# Patient Record
Sex: FEMALE | Race: ASIAN | NOT HISPANIC OR LATINO | Employment: FULL TIME | URBAN - METROPOLITAN AREA
[De-identification: names, ages, dates, MRNs, and addresses within clinical notes are randomized per-mention and may not be internally consistent; named-entity substitution may affect disease eponyms.]

---

## 2017-07-14 ENCOUNTER — NON-PROVIDER VISIT (OUTPATIENT)
Dept: OCCUPATIONAL MEDICINE | Facility: CLINIC | Age: 47
End: 2017-07-14

## 2017-07-14 DIAGNOSIS — Z02.1 PRE-EMPLOYMENT HEALTH SCREENING EXAMINATION: ICD-10-CM

## 2017-07-14 PROCEDURE — 8898 PR URINE 11 PANEL - SEND TO LAB: Performed by: PREVENTIVE MEDICINE

## 2017-07-14 NOTE — MR AVS SNAPSHOT
Patti Cooley   2017 8:30 AM   Appointment   MRN: [id number]    Department:  [facility]   Dept Phone:  [phone]    Description:  Female : 1970   Provider:  [place] NON RENOWN MA           Allergies as of 2017     Not on File      Basic Information     Date Of Birth Sex Race Ethnicity Preferred Language    1970 Female Unknown Unknown English      Health Maintenance     Patient has no pending health maintenance at this time      Current Immunizations     No immunizations on file.      Below and/or attached are the medications your provider expects you to take. Review all of your home medications and newly ordered medications with your provider and/or pharmacist. Follow medication instructions as directed by your provider and/or pharmacist. Please keep your medication list with you and share with your provider. Update the information when medications are discontinued, doses are changed, or new medications (including over-the-counter products) are added; and carry medication information at all times in the event of emergency situations     Allergies:  (Not on file)          Medications  Valid as of: 2017 - 11:02 AM    Generic Name Brand Name Tablet Size Instructions for use    .                 Medicines prescribed today were sent to:     None      Medication refill instructions:       If your prescription bottle indicates you have medication refills left, it is not necessary to call your provider’s office. Please contact your pharmacy and they will refill your medication.    If your prescription bottle indicates you do not have any refills left, you may request refills at any time through one of the following ways: The online [place] system (except Urgent Care), by calling your provider’s office, or by asking your pharmacy to contact your provider’s office with a refill request. Medication refills are processed only during regular business hours and may not be available  until the next business day. Your provider may request additional information or to have a follow-up visit with you prior to refilling your medication.   *Please Note: Medication refills are assigned a new Rx number when refilled electronically. Your pharmacy may indicate that no refills were authorized even though a new prescription for the same medication is available at the pharmacy. Please request the medicine by name with the pharmacy before contacting your provider for a refill.           Yanado Access Code: [email or web address]  Expires: 8/16/2017 11:02 AM    Your email address is not on file at [facility].  Email Addresses are required for you to sign up for Yanado, please contact [phone] to verify your personal information and to provide your email address prior to attempting to register for Yanado[place] [address]    Yanado  A secure, online tool to manage your health information     [facility]’s Yanado® is a secure, online tool that connects you to your personalized health information from the privacy of your home -- day or night - making it very easy for you to manage your healthcare. Once the activation process is completed, you can even access your medical information using the Yanado heike, which is available for free in the Apple Heike store or Google Play store.     To learn more about Yanado, visit [email or web address]    There are two levels of access available (as shown below):   My Chart Features  [facility] Primary Care Doctor [facility]  Specialists [facility]  Urgent  Care Non-[facility] Primary Care Doctor   Email your healthcare team securely and privately 24/7 X X X    Manage appointments: schedule your next appointment; view details of past/upcoming appointments X      Request prescription refills. X      View recent personal medical records, including lab and immunizations X X X X   View health record, including health history, allergies, medications X  X X X   Read reports about your outpatient visits, procedures, consult and ER notes X X X X   See your discharge summary, which is a recap of your hospital and/or ER visit that includes your diagnosis, lab results, and care plan X X  X     How to register for Learnhive:  Once your e-mail address has been verified, follow the following steps to sign up for Learnhive.     1. Go to  [email or web address]  2. Click on the Sign Up Now box, which takes you to the New Member Sign Up page. You will need to provide the following information:  a. Enter your Learnhive Access Code exactly as it appears at the top of this page. (You will not need to use this code after you’ve completed the sign-up process. If you do not sign up before the expiration date, you must request a new code.)   b. Enter your date of birth.   c. Enter your home email address.   d. Click Submit, and follow the next screen’s instructions.  3. Create a Learnhive ID. This will be your Learnhive login ID and cannot be changed, so think of one that is secure and easy to remember.  4. Create a Learnhive password. You can change your password at any time.  5. Enter your Password Reset Question and Answer. This can be used at a later time if you forget your password.   6. Enter your e-mail address. This allows you to receive e-mail notifications when new information is available in Learnhive.  7. Click Sign Up. You can now view your health information.    For assistance activating your Learnhive account, call [phone]

## 2018-03-12 ENCOUNTER — HOSPITAL ENCOUNTER (OUTPATIENT)
Dept: RADIOLOGY | Facility: MEDICAL CENTER | Age: 48
End: 2018-03-12

## 2018-04-11 DIAGNOSIS — Z01.812 PRE-OPERATIVE LABORATORY EXAMINATION: ICD-10-CM

## 2018-04-11 LAB
ANION GAP SERPL CALC-SCNC: 9 MMOL/L (ref 0–11.9)
APPEARANCE UR: CLEAR
APTT PPP: 30.2 SEC (ref 24.7–36)
BACTERIA #/AREA URNS HPF: NEGATIVE /HPF
BASOPHILS # BLD AUTO: 0.4 % (ref 0–1.8)
BASOPHILS # BLD: 0.03 K/UL (ref 0–0.12)
BILIRUB UR QL STRIP.AUTO: NEGATIVE
BUN SERPL-MCNC: 12 MG/DL (ref 8–22)
CALCIUM SERPL-MCNC: 9.7 MG/DL (ref 8.5–10.5)
CHLORIDE SERPL-SCNC: 107 MMOL/L (ref 96–112)
CO2 SERPL-SCNC: 24 MMOL/L (ref 20–33)
COLOR UR: YELLOW
CREAT SERPL-MCNC: 0.64 MG/DL (ref 0.5–1.4)
EOSINOPHIL # BLD AUTO: 0.25 K/UL (ref 0–0.51)
EOSINOPHIL NFR BLD: 3.6 % (ref 0–6.9)
EPI CELLS #/AREA URNS HPF: NORMAL /HPF
ERYTHROCYTE [DISTWIDTH] IN BLOOD BY AUTOMATED COUNT: 41.1 FL (ref 35.9–50)
GLUCOSE SERPL-MCNC: 76 MG/DL (ref 65–99)
GLUCOSE UR STRIP.AUTO-MCNC: NEGATIVE MG/DL
HCT VFR BLD AUTO: 45.8 % (ref 37–47)
HGB BLD-MCNC: 15.5 G/DL (ref 12–16)
HYALINE CASTS #/AREA URNS LPF: NORMAL /LPF
IMM GRANULOCYTES # BLD AUTO: 0.02 K/UL (ref 0–0.11)
IMM GRANULOCYTES NFR BLD AUTO: 0.3 % (ref 0–0.9)
INR PPP: 0.88 (ref 0.87–1.13)
KETONES UR STRIP.AUTO-MCNC: NEGATIVE MG/DL
LEUKOCYTE ESTERASE UR QL STRIP.AUTO: ABNORMAL
LYMPHOCYTES # BLD AUTO: 2.72 K/UL (ref 1–4.8)
LYMPHOCYTES NFR BLD: 39.5 % (ref 22–41)
MCH RBC QN AUTO: 29.8 PG (ref 27–33)
MCHC RBC AUTO-ENTMCNC: 33.8 G/DL (ref 33.6–35)
MCV RBC AUTO: 88.1 FL (ref 81.4–97.8)
MICRO URNS: ABNORMAL
MONOCYTES # BLD AUTO: 0.51 K/UL (ref 0–0.85)
MONOCYTES NFR BLD AUTO: 7.4 % (ref 0–13.4)
NEUTROPHILS # BLD AUTO: 3.36 K/UL (ref 2–7.15)
NEUTROPHILS NFR BLD: 48.8 % (ref 44–72)
NITRITE UR QL STRIP.AUTO: NEGATIVE
NRBC # BLD AUTO: 0 K/UL
NRBC BLD-RTO: 0 /100 WBC
PH UR STRIP.AUTO: 6.5 [PH]
PLATELET # BLD AUTO: 255 K/UL (ref 164–446)
PMV BLD AUTO: 9.1 FL (ref 9–12.9)
POTASSIUM SERPL-SCNC: 4.2 MMOL/L (ref 3.6–5.5)
PROT UR QL STRIP: NEGATIVE MG/DL
PROTHROMBIN TIME: 11.7 SEC (ref 12–14.6)
RBC # BLD AUTO: 5.2 M/UL (ref 4.2–5.4)
RBC # URNS HPF: NORMAL /HPF
RBC UR QL AUTO: NEGATIVE
SODIUM SERPL-SCNC: 140 MMOL/L (ref 135–145)
SP GR UR STRIP.AUTO: 1.01
UROBILINOGEN UR STRIP.AUTO-MCNC: 0.2 MG/DL
WBC # BLD AUTO: 6.9 K/UL (ref 4.8–10.8)
WBC #/AREA URNS HPF: NORMAL /HPF

## 2018-04-11 PROCEDURE — 81001 URINALYSIS AUTO W/SCOPE: CPT

## 2018-04-11 PROCEDURE — 85025 COMPLETE CBC W/AUTO DIFF WBC: CPT

## 2018-04-11 PROCEDURE — 80048 BASIC METABOLIC PNL TOTAL CA: CPT

## 2018-04-11 PROCEDURE — 36415 COLL VENOUS BLD VENIPUNCTURE: CPT

## 2018-04-11 PROCEDURE — 85610 PROTHROMBIN TIME: CPT

## 2018-04-11 PROCEDURE — 85730 THROMBOPLASTIN TIME PARTIAL: CPT

## 2018-04-11 PROCEDURE — 87086 URINE CULTURE/COLONY COUNT: CPT

## 2018-04-11 RX ORDER — LEVOTHYROXINE SODIUM 0.12 MG/1
125 TABLET ORAL
COMMUNITY
End: 2021-11-08

## 2018-04-11 RX ORDER — PYRIDOSTIGMINE BROMIDE 60 MG/1
60 TABLET ORAL 2 TIMES DAILY
COMMUNITY

## 2018-04-11 RX ORDER — ACETAMINOPHEN 500 MG
500-1000 TABLET ORAL PRN
COMMUNITY
End: 2021-11-08

## 2018-04-12 ENCOUNTER — APPOINTMENT (OUTPATIENT)
Dept: RADIOLOGY | Facility: MEDICAL CENTER | Age: 48
End: 2018-04-12
Attending: UROLOGY
Payer: COMMERCIAL

## 2018-04-12 ENCOUNTER — HOSPITAL ENCOUNTER (OUTPATIENT)
Facility: MEDICAL CENTER | Age: 48
End: 2018-04-12
Attending: UROLOGY | Admitting: UROLOGY
Payer: COMMERCIAL

## 2018-04-12 VITALS
HEART RATE: 80 BPM | WEIGHT: 153 LBS | HEIGHT: 63 IN | DIASTOLIC BLOOD PRESSURE: 87 MMHG | BODY MASS INDEX: 27.11 KG/M2 | RESPIRATION RATE: 14 BRPM | OXYGEN SATURATION: 93 % | TEMPERATURE: 98 F | SYSTOLIC BLOOD PRESSURE: 119 MMHG

## 2018-04-12 DIAGNOSIS — N20.0 CALCULUS OF KIDNEY: ICD-10-CM

## 2018-04-12 LAB
GRAM STN SPEC: NORMAL
HCG SERPL QL: NEGATIVE
SIGNIFICANT IND 70042: NORMAL
SITE SITE: NORMAL
SOURCE SOURCE: NORMAL

## 2018-04-12 PROCEDURE — A9270 NON-COVERED ITEM OR SERVICE: HCPCS

## 2018-04-12 PROCEDURE — 87070 CULTURE OTHR SPECIMN AEROBIC: CPT

## 2018-04-12 PROCEDURE — 87205 SMEAR GRAM STAIN: CPT

## 2018-04-12 PROCEDURE — 160002 HCHG RECOVERY MINUTES (STAT)

## 2018-04-12 PROCEDURE — 700102 HCHG RX REV CODE 250 W/ 637 OVERRIDE(OP)

## 2018-04-12 PROCEDURE — 700102 HCHG RX REV CODE 250 W/ 637 OVERRIDE(OP): Performed by: RADIOLOGY

## 2018-04-12 PROCEDURE — A9270 NON-COVERED ITEM OR SERVICE: HCPCS | Performed by: RADIOLOGY

## 2018-04-12 PROCEDURE — 84703 CHORIONIC GONADOTROPIN ASSAY: CPT

## 2018-04-12 PROCEDURE — 700111 HCHG RX REV CODE 636 W/ 250 OVERRIDE (IP)

## 2018-04-12 PROCEDURE — 99153 MOD SED SAME PHYS/QHP EA: CPT

## 2018-04-12 PROCEDURE — 700101 HCHG RX REV CODE 250

## 2018-04-12 RX ORDER — ACETAMINOPHEN 500 MG
1000 TABLET ORAL EVERY 6 HOURS
Status: DISCONTINUED | OUTPATIENT
Start: 2018-04-12 | End: 2018-04-12 | Stop reason: HOSPADM

## 2018-04-12 RX ORDER — LIDOCAINE HYDROCHLORIDE 20 MG/ML
INJECTION, SOLUTION INFILTRATION; PERINEURAL
Status: COMPLETED
Start: 2018-04-12 | End: 2018-04-12

## 2018-04-12 RX ORDER — OXYCODONE HYDROCHLORIDE 5 MG/1
5 TABLET ORAL
Status: DISCONTINUED | OUTPATIENT
Start: 2018-04-12 | End: 2018-04-12 | Stop reason: HOSPADM

## 2018-04-12 RX ORDER — ONDANSETRON 2 MG/ML
4 INJECTION INTRAMUSCULAR; INTRAVENOUS PRN
Status: ACTIVE | OUTPATIENT
Start: 2018-04-12 | End: 2018-04-12

## 2018-04-12 RX ORDER — ACETAMINOPHEN 325 MG/1
TABLET ORAL
Status: COMPLETED
Start: 2018-04-12 | End: 2018-04-12

## 2018-04-12 RX ORDER — MIDAZOLAM HYDROCHLORIDE 1 MG/ML
INJECTION INTRAMUSCULAR; INTRAVENOUS
Status: COMPLETED
Start: 2018-04-12 | End: 2018-04-12

## 2018-04-12 RX ORDER — SODIUM CHLORIDE 9 MG/ML
INJECTION, SOLUTION INTRAVENOUS
Status: DISCONTINUED | OUTPATIENT
Start: 2018-04-12 | End: 2018-04-12 | Stop reason: HOSPADM

## 2018-04-12 RX ORDER — CEFTRIAXONE 1 G/1
INJECTION, POWDER, FOR SOLUTION INTRAMUSCULAR; INTRAVENOUS
Status: COMPLETED
Start: 2018-04-12 | End: 2018-04-12

## 2018-04-12 RX ORDER — MIDAZOLAM HYDROCHLORIDE 1 MG/ML
.5-2 INJECTION INTRAMUSCULAR; INTRAVENOUS PRN
Status: ACTIVE | OUTPATIENT
Start: 2018-04-12 | End: 2018-04-12

## 2018-04-12 RX ORDER — ONDANSETRON 2 MG/ML
4 INJECTION INTRAMUSCULAR; INTRAVENOUS EVERY 8 HOURS PRN
Status: DISCONTINUED | OUTPATIENT
Start: 2018-04-12 | End: 2018-04-12 | Stop reason: HOSPADM

## 2018-04-12 RX ORDER — SODIUM CHLORIDE 9 MG/ML
500 INJECTION, SOLUTION INTRAVENOUS PRN
Status: DISCONTINUED | OUTPATIENT
Start: 2018-04-12 | End: 2018-04-12 | Stop reason: HOSPADM

## 2018-04-12 RX ADMIN — MIDAZOLAM 1 MG: 1 INJECTION INTRAMUSCULAR; INTRAVENOUS at 10:02

## 2018-04-12 RX ADMIN — CEFTRIAXONE SODIUM 1000 MG: 1 INJECTION, POWDER, FOR SOLUTION INTRAMUSCULAR; INTRAVENOUS at 10:03

## 2018-04-12 RX ADMIN — MIDAZOLAM HYDROCHLORIDE 1 MG: 1 INJECTION INTRAMUSCULAR; INTRAVENOUS at 10:02

## 2018-04-12 RX ADMIN — MIDAZOLAM HYDROCHLORIDE 1 MG: 1 INJECTION INTRAMUSCULAR; INTRAVENOUS at 10:06

## 2018-04-12 RX ADMIN — LIDOCAINE HYDROCHLORIDE: 20 INJECTION, SOLUTION INFILTRATION; PERINEURAL at 09:00

## 2018-04-12 RX ADMIN — FENTANYL CITRATE 50 MCG: 50 INJECTION, SOLUTION INTRAMUSCULAR; INTRAVENOUS at 10:02

## 2018-04-12 RX ADMIN — SODIUM CHLORIDE: 9 INJECTION, SOLUTION INTRAVENOUS at 08:40

## 2018-04-12 RX ADMIN — ACETAMINOPHEN 650 MG: 325 TABLET, FILM COATED ORAL at 12:15

## 2018-04-12 ASSESSMENT — PAIN SCALES - GENERAL
PAINLEVEL_OUTOF10: 0

## 2018-04-12 NOTE — PROGRESS NOTES
IR Procedure Note:    Site Marked and Confirmed with MD, patient and RN pre procedure. Dr. Nuñez placed a right nephrostomy tube.  The pt tolerated the procedure well; ETCo2 baseline 37, with consistent waveform during the procedure.  Sutures, gauze, medipore applied to right flank, CDI and soft.  Pt alert and verbally appropriate post procedure, vital signs stable during procedure and transport, see flow sheet for vital signs.  Report given to segun RCAVEN.  RN transported pt to PPU.      epacube Flexima Regular 8F x 25cm.  REF: [id number].  LOT: [id number]

## 2018-04-12 NOTE — OR NURSING
1036: Patient from radiology to PPU via gurney s/p neph tube placement. Patient is sleepy.. VSS.  R posterior lower back nephrostomy tube intact. No c/o pain or nausea at this time. Will monitor closely. Vital signs per MD order.   1145: VSS. Family at bedside. Patient awake. Denies pain and nausea at this time. Neph tube site soft, CDI.   1150: Patient ambulated to and from the bathroom without difficulty.  1250:

## 2018-04-12 NOTE — OR SURGEON
Immediate Post- Operative Note        PostOp Diagnosis: RIGHT urolithiasis      Procedure(s): RIGHT PCN      Estimated Blood Loss: Less than 5 ml        Complications: None            4/12/2018     10:30 AM     Tyshawn Nuñez

## 2018-04-12 NOTE — DISCHARGE INSTRUCTIONS
ACTIVITY: Rest and take it easy for the first 24 hours.  A responsible adult is recommended to remain with you during that time.  It is normal to feel sleepy.  We encourage you to not do anything that requires balance, judgment or coordination.    MILD FLU-LIKE SYMPTOMS ARE NORMAL. YOU MAY EXPERIENCE GENERALIZED MUSCLE ACHES, THROAT IRRITATION, HEADACHE AND/OR SOME NAUSEA.    FOR 24 HOURS DO NOT:  Drive, operate machinery or run household appliances.  Drink beer or alcoholic beverages.   Make important decisions or sign legal documents.      DIET: To avoid nausea, slowly advance diet as tolerated, avoiding spicy or greasy foods for the first day.  Add more substantial food to your diet according to your physician's instructions.  Babies can be fed formula or breast milk as soon as they are hungry.  INCREASE FLUIDS AND FIBER TO AVOID CONSTIPATION.    SURGICAL DRESSING/BATHING:     Keep the dressing clean and dry for 2 days.  May remove dressing in 2 days  and can shower.  Do not submerge site under water for 1 week.  No heavy lifting and limit movement for 2 days.      FOLLOW-UP APPOINTMENT:  A follow-up appointment should be arranged with your doctor ; call to schedule.    You should CALL YOUR PHYSICIAN if you develop:  Fever greater than 101 degrees F.  Pain not relieved by medication, or persistent nausea or vomiting.  Excessive bleeding (blood soaking through dressing) or unexpected drainage from the wound.  Extreme redness or swelling around the incision site, drainage of pus or foul smelling drainage.  Inability to urinate or empty your bladder within 8 hours.  Problems with breathing or chest pain.    You should call 911 if you develop problems with breathing or chest pain.  If you are unable to contact your doctor or surgical center, you should go to the nearest emergency room or urgent care center.      Physician's telephone #: [phone]    If any questions arise, call your doctor.  If your doctor is not  available, please feel free to call the Surgical Center at [phone].  The Center is open Monday through Friday from 7AM to 7PM.  You can also call the HEALTH HOTLINE open 24 hours/day, 7 days/week and speak to a nurse at [phone], or toll free at [phone].    A registered nurse may call you a few days after your surgery to see how you are doing after your procedure.    MEDICATIONS: Resume taking daily medication.  Take prescribed pain medication with food.  If no medication is prescribed, you may take non-aspirin pain medication if needed.  PAIN MEDICATION CAN BE VERY CONSTIPATING.  Take a stool softener or laxative such as senokot, pericolace, or milk of magnesia if needed.      If your physician has prescribed pain medication that includes Acetaminophen (Tylenol), do not take additional Acetaminophen (Tylenol) while taking the prescribed medication.    Depression / Suicide Risk    As you are discharged from this [facility] facility, it is important to learn how to keep safe from harming yourself.    Recognize the warning signs:  · Abrupt changes in personality, positive or negative- including increase in energy   · Giving away possessions  · Change in eating patterns- significant weight changes-  positive or negative  · Change in sleeping patterns- unable to sleep or sleeping all the time   · Unwillingness or inability to communicate  · Depression  · Unusual sadness, discouragement and loneliness  · Talk of wanting to die  · Neglect of personal appearance   · Rebelliousness- reckless behavior  · Withdrawal from people/activities they love  · Confusion- inability to concentrate     If you or a loved one observes any of these behaviors or has concerns about self-harm, here's what you can do:  · Talk about it- your feelings and reasons for harming yourself  · Remove any means that you might use to hurt yourself (examples: pills, rope, extension cords, firearm)  · Get professional help from the  community (Mental Health, Substance Abuse, psychological counseling)  · Do not be alone:Call your Safe Contact- someone whom you trust who will be there for you.  · Call your local CRISIS HOTLINE 293-4689 or 821-452-1765  · Call your local Children's Mobile Crisis Response Team [place] (789) 618-8100 or www.NewAer  · Call the toll free National Suicide Prevention Hotlines   · National Suicide Prevention Lifeline 316-914-XOPV (0419)  · [place] Line Network 800-SUICIDE (084-6785)      Percutaneous Nephrostomy    Percutaneous nephrostomy is a procedure to insert a flexible tube into your kidney so that urine can leave your body. This procedure may be done if a medical condition prevents urine from leaving your kidney in the usual way. Urine is normally carried from the kidneys to the bladder through narrow tubes called ureters. A ureter can become blocked because of conditions such as kidney stones, tumors, infection, or blood clots.  The nephrostomy tube will be inserted through your back. After the procedure, the tube will remain in place, and urine will drain from the kidney into a drainage bag outside your body. Draining the urine will relieve pressure and help prevent infection that could damage the kidney. Often, this procedure allows your health care provider to identify the cause of the blockage and plan appropriate treatment.  Tell a health care provider about:  · Any allergies you have.  · All medicines you are taking, including vitamins, herbs, eye drops, creams, and over-the-counter medicines.  · Any problems you or family members have had with anesthetic medicines.  · Any blood disorders you have.  · Any surgeries you have had.  · Any medical conditions you have.  · Whether you are pregnant or may be pregnant.  What are the risks?  Generally, this is a safe procedure. However, problems may occur, including:  · Infection.  · Bleeding.  · Allergic reactions to medicines or dyes used in  the procedure.  · Damage to other structures or organs.  What happens before the procedure?  · Follow instructions from your health care provider about eating or drinking restrictions.  · Ask your health care provider about:  ¨ Changing or stopping your regular medicines. This is especially important if you are taking diabetes medicines or blood thinners.  ¨ Taking medicines such as aspirin and ibuprofen. These medicines can thin your blood. Do not take these medicines before your procedure if your health care provider instructs you not to.  · You may be given antibiotic medicine to help prevent infection.  · You may have blood tests to see how well your kidneys and liver are working and to see how well your blood can clot.  · Plan to have someone take you home from the hospital or clinic.  What happens during the procedure?  · To reduce your risk of infection:  ¨ Your health care team will wash or sanitize their hands.  ¨ Your skin will be washed with soap.  · An IV tube will be inserted into one of your veins. You may be given medicines through this IV tube to help prevent nausea and pain.  · You will be positioned on your abdomen.  · You will be given one or more of the following:  ¨ A medicine to help you relax (sedative).  ¨ A medicine to numb the area (local anesthetic) where the nephrostomy tube will be inserted.  · The nephrostomy tube, which is thin and flexible, will be inserted into a needle.  · The needle will be inserted into your body and guided to your kidney. An imaging method that uses X-ray images (fluoroscopy) will be used to help guide the needle to the kidney.  · A dye will be injected through the nephrostomy tube. Then, X-ray images that highlight your kidney will be taken.  · Next, the needle will be removed, but the nephrostomy tube will be left in your kidney. The tube may be secured to your skin with stitches (sutures).  · A drainage bag will be attached to the nephrostomy tube. Urine will  be able to drain from your kidney to this drainage bag outside your body.  The procedure may vary among health care providers and hospitals.  What happens after the procedure?  · Your blood pressure, heart rate, breathing rate, and blood oxygen level will be monitored until the medicines you were given have worn off.  · You will need to remain lying down for several hours.  · You will be taught how to care for the nephrostomy tube and the drainage bag.  · Do not drive for 24 hours if you were given a sedative.  This information is not intended to replace advice given to you by your health care provider. Make sure you discuss any questions you have with your health care provider.  Document Released: 10/08/2014 Document Revised: 09/29/2017 Document Reviewed: 09/29/2017  Elsevier Interactive Patient Education © 2017 Elsevier Inc.

## 2018-04-13 ENCOUNTER — APPOINTMENT (OUTPATIENT)
Dept: RADIOLOGY | Facility: MEDICAL CENTER | Age: 48
End: 2018-04-13
Attending: UROLOGY
Payer: COMMERCIAL

## 2018-04-13 ENCOUNTER — HOSPITAL ENCOUNTER (OUTPATIENT)
Facility: MEDICAL CENTER | Age: 48
End: 2018-04-14
Attending: UROLOGY | Admitting: UROLOGY
Payer: COMMERCIAL

## 2018-04-13 DIAGNOSIS — N20.0 STAGHORN CALCULUS: Primary | ICD-10-CM

## 2018-04-13 LAB
ABO GROUP BLD: NORMAL
ABO GROUP BLD: NORMAL
ANION GAP SERPL CALC-SCNC: 10 MMOL/L (ref 0–11.9)
BACTERIA UR CULT: NORMAL
BLD GP AB SCN SERPL QL: NORMAL
BUN SERPL-MCNC: 11 MG/DL (ref 8–22)
CALCIUM SERPL-MCNC: 8.9 MG/DL (ref 8.5–10.5)
CHLORIDE SERPL-SCNC: 108 MMOL/L (ref 96–112)
CO2 SERPL-SCNC: 22 MMOL/L (ref 20–33)
CREAT SERPL-MCNC: 0.73 MG/DL (ref 0.5–1.4)
ERYTHROCYTE [DISTWIDTH] IN BLOOD BY AUTOMATED COUNT: 42.2 FL (ref 35.9–50)
GLUCOSE SERPL-MCNC: 128 MG/DL (ref 65–99)
HCT VFR BLD AUTO: 46.4 % (ref 37–47)
HGB BLD-MCNC: 15.8 G/DL (ref 12–16)
MCH RBC QN AUTO: 30 PG (ref 27–33)
MCHC RBC AUTO-ENTMCNC: 34.1 G/DL (ref 33.6–35)
MCV RBC AUTO: 88.2 FL (ref 81.4–97.8)
PLATELET # BLD AUTO: 192 K/UL (ref 164–446)
PMV BLD AUTO: 8.9 FL (ref 9–12.9)
POTASSIUM SERPL-SCNC: 3.7 MMOL/L (ref 3.6–5.5)
RBC # BLD AUTO: 5.26 M/UL (ref 4.2–5.4)
RH BLD: NORMAL
RH BLD: NORMAL
SIGNIFICANT IND 70042: NORMAL
SITE SITE: NORMAL
SODIUM SERPL-SCNC: 140 MMOL/L (ref 135–145)
SOURCE SOURCE: NORMAL
WBC # BLD AUTO: 9 K/UL (ref 4.8–10.8)

## 2018-04-13 PROCEDURE — 160048 HCHG OR STATISTICAL LEVEL 1-5: Performed by: UROLOGY

## 2018-04-13 PROCEDURE — 80048 BASIC METABOLIC PNL TOTAL CA: CPT

## 2018-04-13 PROCEDURE — 700101 HCHG RX REV CODE 250

## 2018-04-13 PROCEDURE — 96375 TX/PRO/DX INJ NEW DRUG ADDON: CPT

## 2018-04-13 PROCEDURE — 82365 CALCULUS SPECTROSCOPY: CPT

## 2018-04-13 PROCEDURE — 700111 HCHG RX REV CODE 636 W/ 250 OVERRIDE (IP): Performed by: UROLOGY

## 2018-04-13 PROCEDURE — 160035 HCHG PACU - 1ST 60 MINS PHASE I: Performed by: UROLOGY

## 2018-04-13 PROCEDURE — 86850 RBC ANTIBODY SCREEN: CPT

## 2018-04-13 PROCEDURE — C1769 GUIDE WIRE: HCPCS | Performed by: UROLOGY

## 2018-04-13 PROCEDURE — A4314 CATH W/DRAINAGE 2-WAY LATEX: HCPCS | Performed by: UROLOGY

## 2018-04-13 PROCEDURE — 500042 HCHG BAG, URINARY DRAINAGE (CLOSED): Performed by: UROLOGY

## 2018-04-13 PROCEDURE — 160002 HCHG RECOVERY MINUTES (STAT): Performed by: UROLOGY

## 2018-04-13 PROCEDURE — C1758 CATHETER, URETERAL: HCPCS | Performed by: UROLOGY

## 2018-04-13 PROCEDURE — A9270 NON-COVERED ITEM OR SERVICE: HCPCS

## 2018-04-13 PROCEDURE — 36415 COLL VENOUS BLD VENIPUNCTURE: CPT

## 2018-04-13 PROCEDURE — 501161 HCHG PROBE, SWISS LITHOCLAST: Performed by: UROLOGY

## 2018-04-13 PROCEDURE — 160029 HCHG SURGERY MINUTES - 1ST 30 MINS LEVEL 4: Performed by: UROLOGY

## 2018-04-13 PROCEDURE — 160009 HCHG ANES TIME/MIN: Performed by: UROLOGY

## 2018-04-13 PROCEDURE — 500423 HCHG DRESSING, ABD COMBINE: Performed by: UROLOGY

## 2018-04-13 PROCEDURE — 700105 HCHG RX REV CODE 258: Performed by: UROLOGY

## 2018-04-13 PROCEDURE — 700102 HCHG RX REV CODE 250 W/ 637 OVERRIDE(OP)

## 2018-04-13 PROCEDURE — 160036 HCHG PACU - EA ADDL 30 MINS PHASE I: Performed by: UROLOGY

## 2018-04-13 PROCEDURE — 88300 SURGICAL PATH GROSS: CPT

## 2018-04-13 PROCEDURE — A4450 NON-WATERPROOF TAPE: HCPCS | Performed by: UROLOGY

## 2018-04-13 PROCEDURE — C2617 STENT, NON-COR, TEM W/O DEL: HCPCS | Performed by: UROLOGY

## 2018-04-13 PROCEDURE — 86901 BLOOD TYPING SEROLOGIC RH(D): CPT

## 2018-04-13 PROCEDURE — 501159 HCHG PROBE, LAP: Performed by: UROLOGY

## 2018-04-13 PROCEDURE — 160041 HCHG SURGERY MINUTES - EA ADDL 1 MIN LEVEL 4: Performed by: UROLOGY

## 2018-04-13 PROCEDURE — 700102 HCHG RX REV CODE 250 W/ 637 OVERRIDE(OP): Performed by: UROLOGY

## 2018-04-13 PROCEDURE — 86900 BLOOD TYPING SEROLOGIC ABO: CPT

## 2018-04-13 PROCEDURE — 96365 THER/PROPH/DIAG IV INF INIT: CPT

## 2018-04-13 PROCEDURE — 700111 HCHG RX REV CODE 636 W/ 250 OVERRIDE (IP)

## 2018-04-13 PROCEDURE — A9270 NON-COVERED ITEM OR SERVICE: HCPCS | Performed by: UROLOGY

## 2018-04-13 PROCEDURE — 96366 THER/PROPH/DIAG IV INF ADDON: CPT

## 2018-04-13 PROCEDURE — 85027 COMPLETE CBC AUTOMATED: CPT

## 2018-04-13 PROCEDURE — A4346 CATH INDW FOLEY 3 WAY: HCPCS | Performed by: UROLOGY

## 2018-04-13 PROCEDURE — 500163 HCHG CATH SET, MALECOT NEPH RNTRY 24F: Performed by: UROLOGY

## 2018-04-13 PROCEDURE — 501838 HCHG SUTURE GENERAL: Performed by: UROLOGY

## 2018-04-13 PROCEDURE — G0378 HOSPITAL OBSERVATION PER HR: HCPCS

## 2018-04-13 PROCEDURE — C1729 CATH, DRAINAGE: HCPCS | Performed by: UROLOGY

## 2018-04-13 PROCEDURE — 501329 HCHG SET, CYSTO IRRIG Y TUR: Performed by: UROLOGY

## 2018-04-13 DEVICE — STENT UROLOGICAL POLARIS 6X26  ULTRA: Type: IMPLANTABLE DEVICE | Status: FUNCTIONAL

## 2018-04-13 RX ORDER — SODIUM CHLORIDE, SODIUM LACTATE, POTASSIUM CHLORIDE, CALCIUM CHLORIDE 600; 310; 30; 20 MG/100ML; MG/100ML; MG/100ML; MG/100ML
INJECTION, SOLUTION INTRAVENOUS CONTINUOUS
Status: DISCONTINUED | OUTPATIENT
Start: 2018-04-13 | End: 2018-04-14 | Stop reason: HOSPADM

## 2018-04-13 RX ORDER — OXYCODONE HYDROCHLORIDE 5 MG/1
5 TABLET ORAL
Status: DISCONTINUED | OUTPATIENT
Start: 2018-04-13 | End: 2018-04-14 | Stop reason: HOSPADM

## 2018-04-13 RX ORDER — HEPARIN SODIUM 5000 [USP'U]/ML
5000 INJECTION, SOLUTION INTRAVENOUS; SUBCUTANEOUS EVERY 8 HOURS
Status: DISCONTINUED | OUTPATIENT
Start: 2018-04-13 | End: 2018-04-13

## 2018-04-13 RX ORDER — ACETAMINOPHEN 500 MG
1000 TABLET ORAL EVERY 6 HOURS
Status: DISCONTINUED | OUTPATIENT
Start: 2018-04-13 | End: 2018-04-14 | Stop reason: HOSPADM

## 2018-04-13 RX ORDER — OXYCODONE HYDROCHLORIDE 5 MG/1
5-10 TABLET ORAL EVERY 4 HOURS PRN
Qty: 30 TAB | Refills: 0 | Status: SHIPPED | OUTPATIENT
Start: 2018-04-13 | End: 2018-04-18

## 2018-04-13 RX ORDER — AMOXICILLIN 250 MG
2 CAPSULE ORAL
Status: DISCONTINUED | OUTPATIENT
Start: 2018-04-13 | End: 2018-04-14 | Stop reason: HOSPADM

## 2018-04-13 RX ORDER — LEVOTHYROXINE SODIUM 0.03 MG/1
125 TABLET ORAL
Status: DISCONTINUED | OUTPATIENT
Start: 2018-04-14 | End: 2018-04-14 | Stop reason: HOSPADM

## 2018-04-13 RX ORDER — DOCUSATE SODIUM 100 MG/1
100 CAPSULE, LIQUID FILLED ORAL 2 TIMES DAILY
Qty: 60 CAP | Refills: 0 | Status: SHIPPED | OUTPATIENT
Start: 2018-04-13 | End: 2021-11-08

## 2018-04-13 RX ORDER — HYDROXYZINE 50 MG/1
25 TABLET, FILM COATED ORAL
Status: DISCONTINUED | OUTPATIENT
Start: 2018-04-13 | End: 2018-04-14 | Stop reason: HOSPADM

## 2018-04-13 RX ORDER — MORPHINE SULFATE 4 MG/ML
2 INJECTION, SOLUTION INTRAMUSCULAR; INTRAVENOUS
Status: DISCONTINUED | OUTPATIENT
Start: 2018-04-13 | End: 2018-04-14 | Stop reason: HOSPADM

## 2018-04-13 RX ORDER — OXYCODONE HYDROCHLORIDE 10 MG/1
10 TABLET ORAL
Status: DISCONTINUED | OUTPATIENT
Start: 2018-04-13 | End: 2018-04-14 | Stop reason: HOSPADM

## 2018-04-13 RX ORDER — LIDOCAINE HYDROCHLORIDE 10 MG/ML
0.5 INJECTION, SOLUTION INFILTRATION; PERINEURAL
Status: ACTIVE | OUTPATIENT
Start: 2018-04-13 | End: 2018-04-14

## 2018-04-13 RX ORDER — METOCLOPRAMIDE HYDROCHLORIDE 5 MG/ML
10 INJECTION INTRAMUSCULAR; INTRAVENOUS EVERY 4 HOURS PRN
Status: DISCONTINUED | OUTPATIENT
Start: 2018-04-13 | End: 2018-04-14

## 2018-04-13 RX ORDER — ONDANSETRON 2 MG/ML
4 INJECTION INTRAMUSCULAR; INTRAVENOUS EVERY 6 HOURS PRN
Status: DISCONTINUED | OUTPATIENT
Start: 2018-04-13 | End: 2018-04-14 | Stop reason: HOSPADM

## 2018-04-13 RX ORDER — PYRIDOSTIGMINE BROMIDE 60 MG/1
60 TABLET ORAL 2 TIMES DAILY
Status: DISCONTINUED | OUTPATIENT
Start: 2018-04-13 | End: 2018-04-14 | Stop reason: HOSPADM

## 2018-04-13 RX ORDER — OXYBUTYNIN CHLORIDE 10 MG/1
10 TABLET, EXTENDED RELEASE ORAL
Qty: 30 TAB | Refills: 0 | Status: SHIPPED | OUTPATIENT
Start: 2018-04-13 | End: 2021-11-08

## 2018-04-13 RX ORDER — DEXTROSE AND SODIUM CHLORIDE 5; .9 G/100ML; G/100ML
INJECTION, SOLUTION INTRAVENOUS CONTINUOUS
Status: DISCONTINUED | OUTPATIENT
Start: 2018-04-13 | End: 2018-04-14 | Stop reason: HOSPADM

## 2018-04-13 RX ORDER — POLYETHYLENE GLYCOL 3350 17 G/17G
1 POWDER, FOR SOLUTION ORAL DAILY
Status: DISCONTINUED | OUTPATIENT
Start: 2018-04-13 | End: 2018-04-14 | Stop reason: HOSPADM

## 2018-04-13 RX ORDER — LIDOCAINE HYDROCHLORIDE 10 MG/ML
INJECTION, SOLUTION INFILTRATION; PERINEURAL
Status: COMPLETED
Start: 2018-04-13 | End: 2018-04-13

## 2018-04-13 RX ORDER — OXYCODONE HCL 5 MG/5 ML
SOLUTION, ORAL ORAL
Status: COMPLETED
Start: 2018-04-13 | End: 2018-04-13

## 2018-04-13 RX ORDER — POLYETHYLENE GLYCOL 3350 17 G/17G
17 POWDER, FOR SOLUTION ORAL DAILY
Qty: 30 EACH | Refills: 0 | Status: SHIPPED | OUTPATIENT
Start: 2018-04-13 | End: 2021-11-08

## 2018-04-13 RX ORDER — KETOROLAC TROMETHAMINE 30 MG/ML
15 INJECTION, SOLUTION INTRAMUSCULAR; INTRAVENOUS EVERY 6 HOURS
Status: DISCONTINUED | OUTPATIENT
Start: 2018-04-13 | End: 2018-04-13

## 2018-04-13 RX ADMIN — SODIUM CHLORIDE, SODIUM LACTATE, POTASSIUM CHLORIDE, CALCIUM CHLORIDE: 600; 310; 30; 20 INJECTION, SOLUTION INTRAVENOUS at 10:41

## 2018-04-13 RX ADMIN — FENTANYL CITRATE 25 MCG: 50 INJECTION, SOLUTION INTRAMUSCULAR; INTRAVENOUS at 12:27

## 2018-04-13 RX ADMIN — OXYCODONE HYDROCHLORIDE 10 MG: 5 SOLUTION ORAL at 10:23

## 2018-04-13 RX ADMIN — DEXTROSE AND SODIUM CHLORIDE: 5; 900 INJECTION, SOLUTION INTRAVENOUS at 16:10

## 2018-04-13 RX ADMIN — MORPHINE SULFATE 2 MG: 4 INJECTION INTRAVENOUS at 20:05

## 2018-04-13 RX ADMIN — ACETAMINOPHEN 1000 MG: 500 TABLET ORAL at 16:05

## 2018-04-13 RX ADMIN — CEFAZOLIN SODIUM 1 G: 1 INJECTION, SOLUTION INTRAVENOUS at 21:19

## 2018-04-13 RX ADMIN — CEFAZOLIN SODIUM 1 G: 1 INJECTION, SOLUTION INTRAVENOUS at 16:08

## 2018-04-13 RX ADMIN — FENTANYL CITRATE 50 MCG: 50 INJECTION, SOLUTION INTRAMUSCULAR; INTRAVENOUS at 10:38

## 2018-04-13 RX ADMIN — OXYCODONE HYDROCHLORIDE 10 MG: 10 TABLET ORAL at 21:18

## 2018-04-13 RX ADMIN — PYRIDOSTIGMINE BROMIDE 60 MG: 60 TABLET ORAL at 20:08

## 2018-04-13 RX ADMIN — FENTANYL CITRATE 50 MCG: 50 INJECTION, SOLUTION INTRAMUSCULAR; INTRAVENOUS at 10:25

## 2018-04-13 ASSESSMENT — ENCOUNTER SYMPTOMS
FEVER: 0
VOMITING: 0
BACK PAIN: 1
CHILLS: 0
NAUSEA: 0
SHORTNESS OF BREATH: 0

## 2018-04-13 ASSESSMENT — PAIN SCALES - GENERAL
PAINLEVEL_OUTOF10: 6
PAINLEVEL_OUTOF10: 4
PAINLEVEL_OUTOF10: 7
PAINLEVEL_OUTOF10: 6
PAINLEVEL_OUTOF10: ASSUMED PAIN PRESENT
PAINLEVEL_OUTOF10: 5
PAINLEVEL_OUTOF10: ASSUMED PAIN PRESENT
PAINLEVEL_OUTOF10: ASSUMED PAIN PRESENT
PAINLEVEL_OUTOF10: 8
PAINLEVEL_OUTOF10: ASSUMED PAIN PRESENT
PAINLEVEL_OUTOF10: 6
PAINLEVEL_OUTOF10: ASSUMED PAIN PRESENT

## 2018-04-13 ASSESSMENT — PATIENT HEALTH QUESTIONNAIRE - PHQ9
SUM OF ALL RESPONSES TO PHQ9 QUESTIONS 1 AND 2: 0
1. LITTLE INTEREST OR PLEASURE IN DOING THINGS: NOT AT ALL
2. FEELING DOWN, DEPRESSED, IRRITABLE, OR HOPELESS: NOT AT ALL

## 2018-04-13 ASSESSMENT — LIFESTYLE VARIABLES
EVER_SMOKED: NEVER
ALCOHOL_USE: NO

## 2018-04-13 NOTE — H&P
[facility] [place] Consult/H&P Note    Primary Service: Urology  Attending: Darwin Robb M.D.  Patient's Name/MRN: Patti Cooley, [id number]    Admit Date:4/13/2018  Today's Date: 4/13/2018   Length of stay:  LOS: 0 days   Room #: TPREPOOL/NONE      Reason for consult/chief complaint: bilateral nephrolithiasis, large R stone  ID/HPI: Patti Cooley is a 47 y.o. female patient with history of stones requiring ESWL and PCNL back in 2012 found on recent imaging to have large R >2cm stone and 1cm stone in L kidney. She does have intermittent pain on that side. She does have anxiety.  She is here today for PCNL of her R stone and had nephrostomy tube placed by [facility]. No new nausea, vomting, fevers or chills. Admits to some fatigue and feeling tired.      Past Medical History:   Past Medical History:   Diagnosis Date   • Calculus, urinary    • Hypothyroid    • Myasthenia gravis (CMS-HCC)    • Pain 04/11/2018    right flank, 7/10        Past Surgical History:   Past Surgical History:   Procedure Laterality Date   • OTHER Right 04/12/2018    nephrostomy tube placement   • UROLOGY SURGERY  2012    kidney stones   • APPENDECTOMY          Family History:   History reviewed. No pertinent family history.      Social History:   Social History   Substance Use Topics   • Smoking status: Current Every Day Smoker     Packs/day: 0.50     Years: 20.00     Types: Cigarettes   • Smokeless tobacco: Never Used   • Alcohol use No      Social History     Social History Narrative   • No narrative on file        Allergies: she Patient has no known allergies.    Medications:   Prescriptions Prior to Admission   Medication Sig Dispense Refill Last Dose   • levothyroxine (SYNTHROID) 125 MCG Tab Take 125 mcg by mouth Every morning on an empty stomach.   4/13/2018 at 0500   • pyridostigmine (MESTINON) 60 MG Tab Take 60 mg by mouth 2 times a day.   4/12/2018 at 1500   • acetaminophen (TYLENOL) 500 MG Tab Take 500-1,000 mg by mouth as  "needed.   4/12/2018 at 2000   • Ibuprofen-Diphenhydramine HCl (ADVIL PM) 200-25 MG Cap Take 3 Caps by mouth at bedtime as needed.   4/6/2018 at Unknown time         Review of Systems  Review of Systems   Constitutional: Positive for malaise/fatigue. Negative for chills and fever.   Respiratory: Negative for shortness of breath.    Cardiovascular: Negative for chest pain.   Gastrointestinal: Negative for nausea and vomiting.   Genitourinary: Negative for dysuria, frequency and urgency.   Musculoskeletal: Positive for back pain.        Physical Exam  VITAL SIGNS: /87   Pulse 72   Temp 36.7 °C (98.1 °F)   Ht 1.6 m (5' 3\")   Wt 69.3 kg (152 lb 12.5 oz)   LMP  (LMP Unknown)   SpO2 99%   BMI 27.06 kg/m²   GENERAL:  alert, in no acute distress  EYES: EOMI and normal accomodation  BACK: No CVA tenderness.   CHEST AND LUNGS: CTAB without W/R/R.   CARDIOVASCULAR: RRR without M/R/G's.  ABDOMEN: Abdomen soft, nontender. No masses, organomegaly.  : light clear urine in R nephrostomy tube, no pain  EXTREMITIES: Warm and well perfused without c/c/e  NEURO: No focal deficits  SKIN: Skin color, texture, turgor normal. No rashes, lesions, nor jaundice.      Labs:  CBC:   Lab Results   Component Value Date/Time    WBC 6.9 04/11/2018 10:48 AM    HEMOGLOBIN 15.5 04/11/2018 10:48 AM    HEMATOCRIT 45.8 04/11/2018 10:48 AM       Glucose:  Lab Results   Component Value Date/Time    GLUCOSE 76 04/11/2018 10:48 AM    Electrolytes:  Lab Results   Component Value Date/Time    SODIUM 140 04/11/2018 10:48 AM    POTASSIUM 4.2 04/11/2018 10:48 AM    CHLORIDE 107 04/11/2018 10:48 AM    CO2 24 04/11/2018 10:48 AM    GLUCOSE 76 04/11/2018 10:48 AM    BUN 12 04/11/2018 10:48 AM    CREATININE 0.64 04/11/2018 10:48 AM    CALCIUM 9.7 04/11/2018 10:48 AM       Coags:  Lab Results   Component Value Date/Time    INR 0.88 04/11/2018 10:48 AM         Urinalysis:   Lab Results   Component Value Date/Time    COLORURINE Yellow 04/11/2018 10:48 AM "    CLARITY Clear 04/11/2018 10:48 AM    SPECGRAVITY 1.013 04/11/2018 10:48 AM    PHURINE 6.5 04/11/2018 10:48 AM    GLUCOSEUR Negative 04/11/2018 10:48 AM    KETONES Negative 04/11/2018 10:48 AM    NITRITE Negative 04/11/2018 10:48 AM    OCCULTBLOOD Negative 04/11/2018 10:48 AM    RBCURINE 0-2 04/11/2018 10:48 AM    BACTERIA Negative 04/11/2018 10:48 AM    EPITHELCELL Few 04/11/2018 10:48 AM     Imaging:  CT abd/pelv R 2cm large lower pole stone extending towards renal pelvis. L 1cm stone in lower pole.      Assessment/Recommendation   47 y.o.F with large R stone consented for PCNL.  She understands the risk of inability to access ureter, the need for second procedures, the possibility persistent stone, that she may have stent discomfort until this is removed, bleeding, infection, ureteral injury or stricture, possible need for transfusion, post op urinary retention requiring carrillo catheter, and the general pulmonary and cardiovascular risks associated with anesthesia.       · Consent for R PCNL  · Type and cross given not done  · Admit post op       Darwin Robb MD  [id number]  [place] [address]  [phone]

## 2018-04-13 NOTE — OR NURSING
Pt A&OX4. VSS. Pt on 2 L of oxygen via nasal cannula. R flank dressing CDI. Doss and R nephrostomy tube in place, cloudy red drainage out. Pt states pain 6/10 to R flank/abd, medicated per anesthesia order set. Pt has no complaints of nausea. Script for oxycodone on chart. Daughter, Naz, updated with room assignment. Report called to MERISSA Martinez. Awaiting transport.

## 2018-04-13 NOTE — OP REPORT
Urologic Surgery Operative Report     Pre-operative Diagnosis: 1. Right staghorn nephrolithiasis (592.0)  2. Left nephrolithiasis   3. Intermittent right renal colic   4. History of significance nephrolithiasis burden status post ESWL and PCNL back in 2012    Post-operative Diagnosis: Same as above   Procedure: Right percutaneous nephrolithotomy 48105 PCNL (>2.0 cm)   Attending: Darwin Robb M.D.    Assistant: none   Anesthesia: Et, General, Herb   Estimated Blood Loss: 50cc   IV fluids: 2000 cc crystalloid   Specimens: None   Drains: 1. [id number] right JJ stent   2. 20F [place] tip catheter non latex with 3mL balloon as neph tube   Complications: None   Condition: Stable, procedure well tolerated    Disposition:  PACU, admission, CT in Am, nephrostomy tube out tomorrow, stent out in 14 days in clinic with cystoscopy, f/u with me the same day to discuss management of R stone.    Findings: 1. 2.3cm stone at R renal pelvis, visualized on fluoro, nice access through calyx, no residual stone fragments on repeat pyeloscopy at end.      Indications for Procedure:  Mrs. Cooley is a very pleasant 87-year-old female hx of nephrolithiasis found to have stone burden large enough that PCNL was indicated over laser lithotripsy or shockwave lithotripsy.  After a discussion regarding risks, benefits and alternatives of procedure, the patient was consented for right PCNL for removal of stones.  He knows risks of PCNL and even the PCNT include fevers, sepsis, hemorrhage, bowel injury, injury to surrounding organ (ie spleen/liver), pulmonary and cardiac risks associated with anesthesia, inability to access the stone, need for secondary procedures, and even death. Prior to the procedure she underwent placement of nephrostomy tube on desired side by the interventional radiologist.    Procedure in Detail:  The patient was brought to the operating room and placed supine on the operating table. General anesthesia was administered by  the Anesthesia team. SCDs were placed for DVT prophylaxis. Ancef IV had recently been given on the floor, so no further antibiotics were given at the start of the operation. All pressure points were padded. A carrillo catheter was placed. The patient was then placed in the prone position and then prepped and draped in the usual sterile fashion. A timeout was performed to confirm correct patient and procedure.     We first shot an antegrade nephrostogram using contrast through the nephrostomy tube to determine layout of her collecting system. We then placed a wire down through the neph tube down to the level of bladder as seen on fluoroscopy. We then removed the tube leaving the wire in place. A dual lumen catheter was then placed over the first wire. We then placed a second wire down to the level of the bladder and removed our dual lumen catheter. A renal balloon dilator was then advanced over the stiff wire and into the renal pelvis. The balloon was then dilated with contrast under fluoroscopy to 18 atmospheres. The balloon was held in this position for about 1 minute. There was no waist seen in the balloon on fluoroscopy. We then advanced our access sheath over the balloon under fluoroscopy to the level edge of the renal calyx. Our balloon was then deflated and removed. We then placed our nephroscope into the access sheath and encountered the large stone in the renal pelvis which was fairly brittle. Using the lithoclast device with pneumatic and sonographic instruments, we then proceeded to dust and remove the entire stone fragment*. We cleared all the stones that we could access with our nephroscope in the lower pole and renal pelvis. We were able to advance the nephroscope up to the renal pelvis which was clear of stones. We then removed the nephroscope and placed a flexible cystoscope into our sheath. We advanced our cystoscope to inspect the other poles were there were no other stones visualized in the upper or  mid poles I could access.  We then turned our attention to the ureter and advanced our cystoscope down the proximal ureter and into the mid ureter. No stones or lesions were seen up to the level of the mid ureter.     Retuning to the nephroscope I backloaded our safety wire through the camera a 6 Vincentian by 26 cm stent was then advanced under direct vision through the nephroscope over wire until it reached the bladder. The wire was removed leaving a good curl in the bladder on fluoroscopy and a good curl in the renal pelvis seen visually.  I did place a 2nd wire through the nephroscope down to the bladder. The nephroscope was removed Platinum tip catheter was placed as a nephrostomy tube over this wire. This was advanced into the renal pelvis under fluoroscopy. We confirmed that the balloon was, in fact, in the renal pelvis by inflating it with 2 mL of contrast/NS mixed. We then performed an antegrade nephrostogram through our nephrostomy tube. This demonstrated no extravasation and showed contrast draining clearly down to the level of the bladder. We removed our access sheath, as well as our safety wires. The nephrostomy tube was secured with a 2-0 silk stitch. The nephrostomy tube was attached to a drainage bag and this marked the end of our case. All needle, sponge and instrument counts were correct at the end of the case.        Darwin Robb MD  [id number]  [place] [address]  [phone]

## 2018-04-14 ENCOUNTER — APPOINTMENT (OUTPATIENT)
Dept: RADIOLOGY | Facility: MEDICAL CENTER | Age: 48
End: 2018-04-14
Attending: UROLOGY
Payer: COMMERCIAL

## 2018-04-14 VITALS
RESPIRATION RATE: 17 BRPM | TEMPERATURE: 98 F | SYSTOLIC BLOOD PRESSURE: 120 MMHG | WEIGHT: 152.78 LBS | DIASTOLIC BLOOD PRESSURE: 70 MMHG | BODY MASS INDEX: 27.07 KG/M2 | HEIGHT: 63 IN | OXYGEN SATURATION: 95 % | HEART RATE: 62 BPM

## 2018-04-14 LAB
ANION GAP SERPL CALC-SCNC: 7 MMOL/L (ref 0–11.9)
BUN SERPL-MCNC: 12 MG/DL (ref 8–22)
CALCIUM SERPL-MCNC: 8.9 MG/DL (ref 8.5–10.5)
CHLORIDE SERPL-SCNC: 106 MMOL/L (ref 96–112)
CO2 SERPL-SCNC: 26 MMOL/L (ref 20–33)
CREAT SERPL-MCNC: 0.75 MG/DL (ref 0.5–1.4)
ERYTHROCYTE [DISTWIDTH] IN BLOOD BY AUTOMATED COUNT: 44.3 FL (ref 35.9–50)
GLUCOSE SERPL-MCNC: 130 MG/DL (ref 65–99)
HCT VFR BLD AUTO: 39 % (ref 37–47)
HGB BLD-MCNC: 13 G/DL (ref 12–16)
MCH RBC QN AUTO: 30.3 PG (ref 27–33)
MCHC RBC AUTO-ENTMCNC: 33.3 G/DL (ref 33.6–35)
MCV RBC AUTO: 90.9 FL (ref 81.4–97.8)
PLATELET # BLD AUTO: 238 K/UL (ref 164–446)
PMV BLD AUTO: 9 FL (ref 9–12.9)
POTASSIUM SERPL-SCNC: 4.3 MMOL/L (ref 3.6–5.5)
RBC # BLD AUTO: 4.29 M/UL (ref 4.2–5.4)
SODIUM SERPL-SCNC: 139 MMOL/L (ref 135–145)
WBC # BLD AUTO: 12.1 K/UL (ref 4.8–10.8)

## 2018-04-14 PROCEDURE — G0378 HOSPITAL OBSERVATION PER HR: HCPCS

## 2018-04-14 PROCEDURE — 85027 COMPLETE CBC AUTOMATED: CPT

## 2018-04-14 PROCEDURE — 36415 COLL VENOUS BLD VENIPUNCTURE: CPT

## 2018-04-14 PROCEDURE — A9270 NON-COVERED ITEM OR SERVICE: HCPCS | Performed by: UROLOGY

## 2018-04-14 PROCEDURE — 96375 TX/PRO/DX INJ NEW DRUG ADDON: CPT

## 2018-04-14 PROCEDURE — 700111 HCHG RX REV CODE 636 W/ 250 OVERRIDE (IP): Performed by: UROLOGY

## 2018-04-14 PROCEDURE — 80048 BASIC METABOLIC PNL TOTAL CA: CPT

## 2018-04-14 PROCEDURE — 700102 HCHG RX REV CODE 250 W/ 637 OVERRIDE(OP): Performed by: UROLOGY

## 2018-04-14 PROCEDURE — 74176 CT ABD & PELVIS W/O CONTRAST: CPT

## 2018-04-14 PROCEDURE — 700105 HCHG RX REV CODE 258: Performed by: UROLOGY

## 2018-04-14 PROCEDURE — 302085 CLAMP OSTOMY: Performed by: UROLOGY

## 2018-04-14 RX ORDER — METOCLOPRAMIDE 10 MG/1
10 TABLET ORAL EVERY 4 HOURS PRN
Status: DISCONTINUED | OUTPATIENT
Start: 2018-04-14 | End: 2018-04-14 | Stop reason: HOSPADM

## 2018-04-14 RX ORDER — OXYCODONE HYDROCHLORIDE 5 MG/1
5-10 TABLET ORAL EVERY 4 HOURS PRN
Qty: 30 TAB | Refills: 0 | Status: SHIPPED | OUTPATIENT
Start: 2018-04-14 | End: 2018-04-19

## 2018-04-14 RX ADMIN — LEVOTHYROXINE SODIUM 125 MCG: 25 TABLET ORAL at 06:03

## 2018-04-14 RX ADMIN — PYRIDOSTIGMINE BROMIDE 60 MG: 60 TABLET ORAL at 08:44

## 2018-04-14 RX ADMIN — OXYCODONE HYDROCHLORIDE 5 MG: 5 TABLET ORAL at 08:47

## 2018-04-14 RX ADMIN — ACETAMINOPHEN 1000 MG: 500 TABLET ORAL at 13:07

## 2018-04-14 RX ADMIN — OXYCODONE HYDROCHLORIDE 10 MG: 10 TABLET ORAL at 00:42

## 2018-04-14 RX ADMIN — ONDANSETRON 4 MG: 2 INJECTION, SOLUTION INTRAMUSCULAR; INTRAVENOUS at 07:49

## 2018-04-14 RX ADMIN — ACETAMINOPHEN 1000 MG: 500 TABLET ORAL at 06:02

## 2018-04-14 RX ADMIN — ACETAMINOPHEN 1000 MG: 500 TABLET ORAL at 00:42

## 2018-04-14 RX ADMIN — OXYCODONE HYDROCHLORIDE 5 MG: 5 TABLET ORAL at 13:07

## 2018-04-14 RX ADMIN — DEXTROSE AND SODIUM CHLORIDE: 5; 900 INJECTION, SOLUTION INTRAVENOUS at 02:36

## 2018-04-14 ASSESSMENT — PATIENT HEALTH QUESTIONNAIRE - PHQ9
SUM OF ALL RESPONSES TO PHQ9 QUESTIONS 1 AND 2: 0
2. FEELING DOWN, DEPRESSED, IRRITABLE, OR HOPELESS: NOT AT ALL
1. LITTLE INTEREST OR PLEASURE IN DOING THINGS: NOT AT ALL

## 2018-04-14 ASSESSMENT — ENCOUNTER SYMPTOMS
FLANK PAIN: 1
FEVER: 0
CHILLS: 0

## 2018-04-14 ASSESSMENT — LIFESTYLE VARIABLES: EVER_SMOKED: NEVER

## 2018-04-14 ASSESSMENT — PAIN SCALES - GENERAL
PAINLEVEL_OUTOF10: 4
PAINLEVEL_OUTOF10: 4
PAINLEVEL_OUTOF10: 7
PAINLEVEL_OUTOF10: 2

## 2018-04-14 NOTE — PROGRESS NOTES
Surgical Progress Note    Author: Keshawn Herron Date & Time created: 2018   1:24 PM     Interval Events:   POD #1  Patient seen and examined.  NPT clamped this morning and carrillo removed. She has pain at the NPT site but this has not changed since it was clamped. Tolerates diet. No fevers.     Review of Systems   Constitutional: Negative for chills and fever.   Genitourinary: Positive for flank pain.     Hemodynamics:  Temp (24hrs), Av.7 °C (98 °F), Min:36.4 °C (97.6 °F), Max:36.9 °C (98.5 °F)  Temperature: 36.6 °C (97.9 °F)  Pulse  Av.5  Min: 60  Max: 97   Blood Pressure: 114/65     Respiratory:    Respiration: 17, Pulse Oximetry: 92 %           Neuro:  GCS       Fluids:    Intake/Output Summary (Last 24 hours) at 18 1324  Last data filed at 18 0423   Gross per 24 hour   Intake                0 ml   Output              850 ml   Net             -850 ml        Current Diet Order   Procedures   • DIET ORDER     Physical Exam   Constitutional: She is oriented to person, place, and time. She appears well-developed and well-nourished. No distress.   Cardiovascular: Normal rate and regular rhythm.    Pulmonary/Chest: Effort normal and breath sounds normal.   Abdominal: Soft. She exhibits no distension. There is tenderness.   Genitourinary:   Genitourinary Comments: Right NPT in place, TTP at insertion site. Urine in bag blood tinged.   Neurological: She is alert and oriented to person, place, and time.   Skin: Skin is warm and dry.   Psychiatric: She has a normal mood and affect.   Nursing note and vitals reviewed.    Labs:  Recent Results (from the past 24 hour(s))   CBC WITHOUT DIFFERENTIAL    Collection Time: 18  3:38 AM   Result Value Ref Range    WBC 12.1 (H) 4.8 - 10.8 K/uL    RBC 4.29 4.20 - 5.40 M/uL    Hemoglobin 13.0 12.0 - 16.0 g/dL    Hematocrit 39.0 37.0 - 47.0 %    MCV 90.9 81.4 - 97.8 fL    MCH 30.3 27.0 - 33.0 pg    MCHC 33.3 (L) 33.6 - 35.0 g/dL    RDW 44.3 35.9 - 50.0  fL    Platelet Count 238 164 - 446 K/uL    MPV 9.0 9.0 - 12.9 fL   BASIC METABOLIC PANEL    Collection Time: 04/14/18  3:38 AM   Result Value Ref Range    Sodium 139 135 - 145 mmol/L    Potassium 4.3 3.6 - 5.5 mmol/L    Chloride 106 96 - 112 mmol/L    Co2 26 20 - 33 mmol/L    Glucose 130 (H) 65 - 99 mg/dL    Bun 12 8 - 22 mg/dL    Creatinine 0.75 0.50 - 1.40 mg/dL    Calcium 8.9 8.5 - 10.5 mg/dL    Anion Gap 7.0 0.0 - 11.9   ESTIMATED GFR    Collection Time: 04/14/18  3:38 AM   Result Value Ref Range    GFR If African American >60 >60 mL/min/1.73 m 2    GFR If Non African American >60 >60 mL/min/1.73 m 2     Medical Decision Making, by Problem:  Active Hospital Problems    Diagnosis   • Staghorn calculus [N20.0]     R >2cm stone s/p PCNL on 4/13/2018      S/P right PCNL 4/13/18      Plan:  NPT removed at bedside without complication. Discussed potential for urine leakage initially.   DC home mid to late afternoon with Percocet  Plan office follow up for cysto and stent removal with Dr. Robb in 2-3 weeks  Off work note given for next Tuesday and Wednesday  Discussed call office or go to ER with increased pain or fever      Quality Measures:  Quality-Core Measures   Reviewed items::  Labs reviewed, Medications reviewed and Radiology images reviewed  Doss catheter::  No Doss      Discussed patient condition with Family, RN, Patient and urology-Dr. Suresh

## 2018-04-14 NOTE — PROGRESS NOTES
Doss catheter removed, neprostomy tube clamped. Instructed pt to report severe flank pain. Will monitor.

## 2018-04-14 NOTE — CARE PLAN
Problem: Communication  Goal: The ability to communicate needs accurately and effectively will improve  Outcome: PROGRESSING AS EXPECTED  Pt. Aware of plan of care at this time. Able to communicate needs affectively as needed. Questions answered and understanding/learning verified by teach back method. Pt. Is now resting comfortably in bed and understands to use bell to voice concerns/needs as they arise.     Problem: Safety  Goal: Will remain free from injury  Outcome: PROGRESSING AS EXPECTED  Pt. Has not had a fall this shift, oriented to unit/surroundings, calls for assistance prior to ambulation, call bell/belongings with in reach. VSS.     Problem: Bowel/Gastric:  Goal: Normal bowel function is maintained or improved  Outcome: PROGRESSING AS EXPECTED  Pt now having gas for first time since procedure yesterday, tolerating a regular diet. Had mild nausea this morning without emesis that subsided with zofran.

## 2018-04-14 NOTE — DISCHARGE INSTRUCTIONS
Discharge Instructions    Discharged to home by car with relative. Discharged via wheelchair, hospital escort: Yes.  Special equipment needed: Not Applicable    Be sure to schedule a follow-up appointment with your primary care doctor or any specialists as instructed.     Discharge Plan:   Influenza Vaccine Indication: Not indicated: Previously immunized this influenza season and > 8 years of age    I understand that a diet low in cholesterol, fat, and sodium is recommended for good health. Unless I have been given specific instructions below for another diet, I accept this instruction as my diet prescription.   Other diet: n/a    Special Instructions: BACK TO WORK AFTER 4/19.    · Is patient discharged on Warfarin / Coumadin?   No     Depression / Suicide Risk    As you are discharged from this [facility] facility, it is important to learn how to keep safe from harming yourself.    Recognize the warning signs:  · Abrupt changes in personality, positive or negative- including increase in energy   · Giving away possessions  · Change in eating patterns- significant weight changes-  positive or negative  · Change in sleeping patterns- unable to sleep or sleeping all the time   · Unwillingness or inability to communicate  · Depression  · Unusual sadness, discouragement and loneliness  · Talk of wanting to die  · Neglect of personal appearance   · Rebelliousness- reckless behavior  · Withdrawal from people/activities they love  · Confusion- inability to concentrate     If you or a loved one observes any of these behaviors or has concerns about self-harm, here's what you can do:  · Talk about it- your feelings and reasons for harming yourself  · Remove any means that you might use to hurt yourself (examples: pills, rope, extension cords, firearm)  · Get professional help from the community (Mental Health, Substance Abuse, psychological counseling)  · Do not be alone:Call your Safe Contact- someone whom you trust who  will be there for you.  · Call your local CRISIS HOTLINE 390-3047 or 281-253-5740  · Call your local Children's Mobile Crisis Response Team [place] (850) 834-1435 or www.Social IQ (Social Influence Quotient)  · Call the toll free National Suicide Prevention Hotlines   · National Suicide Prevention Lifeline 682-288-QUEH (9324)  · National Crackle Line Network 800-SUICIDE (709-9081)

## 2018-04-15 LAB
BACTERIA WND AEROBE CULT: NORMAL
GRAM STN SPEC: NORMAL
SIGNIFICANT IND 70042: NORMAL
SITE SITE: NORMAL
SOURCE SOURCE: NORMAL

## 2018-04-15 NOTE — PROGRESS NOTES
D/c'd home via personal vehicle with significant other, d/c instructions/scripts given to pt and questions answered. Hospital escort brought pt down to curbside via wheelchair. R flank nephrostomy was removed by urology PA

## 2018-04-17 LAB
APPEARANCE STONE: NORMAL
COMPN STONE: NORMAL
NUMBER STONE: NORMAL
SIZE STONE: NORMAL MM
SPECIMEN WT: 81 MG

## 2018-04-17 NOTE — DOCUMENTATION QUERY
DOCUMENTATION QUERY    PROVIDERS: Please select “Cosign w/ note”to reply to query.    To better represent the severity of illness of your patient, please review the following information and exercise your independent professional judgment in responding to this query.     There is a discrepancy in laterality of the patient's condition as well as the procedure performed as documented in the Operative Report. Based upon the clinical findings, risk factors, and treatment, please clarify the laterality of the condition and the procedure performed:    · Urolithiasis with RIGHT lower pole staghorn calculus, treated with RIGHT percutaneous nephrostomy and nephrostogram with USG/FG  · Urolithiasis with LEFT lower pole staghorn calculus, treated with LEFT percutaneous nephrostomy and nephrostogram with USG/FG  · Other explanation of findings (please document)    The medical record reflects the following:   Clinical Findings HISTORY/REASON FOR EXAM: Urolithiasis with RIGHT lower pole staghorn calculus    TECHNIQUE/EXAM DESCRIPTION: RIGHT Percutaneous Nephrostomy and Nephrostogram with ultrasound and fluoroscopic guidance.    PROCEDURE:     The patient was placed prone on the angiography table. The skin was prepped and draped in the usual sterile manner.  A timeout was performed. Local anesthetic result was achieved with administration of 1% lidocaine. A(n) 18-gauge access needle was   advanced into the targeted  LEFT renal collecting system using sonographic guidance. When urine was returned a nephrostogram was performed. Based on my findings access was secured with a wire and the tract was sequentially dilated to accommodate a(n) 8   Syriac locking loop drain. This was put in place and formed. A total of 2 mL of clear yellow fluid was removed and sent for laboratory evaluation. Contrast injection was performed. The catheter was attached to a bag for gravity drainage and secured to   the skin with 2-0 nylon  suture.    FINDINGS:  LEFT urinary collecting system: Staghorn LEFT lower pole renal calculus. No hydronephrosis. No ureteral obstruction.  Nephrostomy tube in good position.     Impression:  Successful image guided LEFT nephrostomy tube placement.        Treatment    Risk Factors    Location within medical record Operative Report     Thank you,   Josefina Cruz MD, MEd, CPC, CCS, CDIP, CIRCC

## 2018-04-23 NOTE — ADDENDUM NOTE
Encounter addended by: Josefina Cruz on: 4/23/2018 10:49 AM<BR>    Actions taken: Sign clinical note

## 2018-05-07 ENCOUNTER — HOSPITAL ENCOUNTER (OUTPATIENT)
Dept: RADIOLOGY | Facility: MEDICAL CENTER | Age: 48
End: 2018-05-07
Attending: UROLOGY
Payer: COMMERCIAL

## 2018-05-07 DIAGNOSIS — N20.0 CALCULUS OF KIDNEY: ICD-10-CM

## 2018-05-07 PROCEDURE — 74018 RADEX ABDOMEN 1 VIEW: CPT

## 2019-11-19 ENCOUNTER — OFFICE VISIT (OUTPATIENT)
Dept: URGENT CARE | Facility: PHYSICIAN GROUP | Age: 49
End: 2019-11-19
Payer: COMMERCIAL

## 2019-11-19 VITALS
HEART RATE: 107 BPM | RESPIRATION RATE: 16 BRPM | HEIGHT: 62 IN | DIASTOLIC BLOOD PRESSURE: 88 MMHG | WEIGHT: 140 LBS | BODY MASS INDEX: 25.76 KG/M2 | SYSTOLIC BLOOD PRESSURE: 132 MMHG | OXYGEN SATURATION: 95 % | TEMPERATURE: 98.7 F

## 2019-11-19 DIAGNOSIS — J03.90 TONSILLITIS: ICD-10-CM

## 2019-11-19 DIAGNOSIS — R13.10 ODYNOPHAGIA: ICD-10-CM

## 2019-11-19 PROCEDURE — 99214 OFFICE O/P EST MOD 30 MIN: CPT | Performed by: FAMILY MEDICINE

## 2019-11-19 RX ORDER — AMOXICILLIN 500 MG/1
500 CAPSULE ORAL 3 TIMES DAILY
Qty: 30 CAP | Refills: 0 | Status: SHIPPED | OUTPATIENT
Start: 2019-11-19 | End: 2021-11-08

## 2019-11-19 RX ORDER — DEXAMETHASONE 4 MG/1
10 TABLET ORAL ONCE
Qty: 3 TAB | Refills: 0 | Status: SHIPPED | OUTPATIENT
Start: 2019-11-19 | End: 2019-11-19

## 2019-11-19 ASSESSMENT — ENCOUNTER SYMPTOMS
NAUSEA: 0
MYALGIAS: 0
FEVER: 1
SORE THROAT: 1
EYE PAIN: 0
SWOLLEN GLANDS: 1
TROUBLE SWALLOWING: 1
CHILLS: 0
SHORTNESS OF BREATH: 0
ABDOMINAL PAIN: 0
DIZZINESS: 0
VOMITING: 0

## 2019-11-19 NOTE — PATIENT INSTRUCTIONS
Pharyngitis  Pharyngitis is a sore throat (pharynx). There is redness, pain, and swelling of your throat.  Follow these instructions at home:  · Drink enough fluids to keep your pee (urine) clear or pale yellow.  · Only take medicine as told by your doctor.  ¨ You may get sick again if you do not take medicine as told. Finish your medicines, even if you start to feel better.  ¨ Do not take aspirin.  · Rest.  · Rinse your mouth (gargle) with salt water (½ tsp of salt per 1 qt of water) every 1-2 hours. This will help the pain.  · If you are not at risk for choking, you can suck on hard candy or sore throat lozenges.  Contact a doctor if:  · You have large, tender lumps on your neck.  · You have a rash.  · You cough up green, yellow-brown, or bloody spit.  Get help right away if:  · You have a stiff neck.  · You drool or cannot swallow liquids.  · You throw up (vomit) or are not able to keep medicine or liquids down.  · You have very bad pain that does not go away with medicine.  · You have problems breathing (not from a stuffy nose).  This information is not intended to replace advice given to you by your health care provider. Make sure you discuss any questions you have with your health care provider.  Document Released: 06/05/2009 Document Revised: 05/25/2017 Document Reviewed: 08/25/2014  [employer] Interactive Patient Education © 2017 [employer]

## 2019-11-19 NOTE — PROGRESS NOTES
"Subjective:   Patti Cooley is a 49 y.o. female who presents for Pharyngitis (x 1 day)     Pharyngitis    This is a new problem. The current episode started yesterday. The problem has been unchanged. Neither side of throat is experiencing more pain than the other. Maximum temperature: Subjective fever and chills. The pain is mild. Associated symptoms include swollen glands and trouble swallowing (Pain with swallowing). Pertinent negatives include no abdominal pain, shortness of breath or vomiting. She has tried NSAIDs for the symptoms. The treatment provided mild relief.     Review of Systems   Constitutional: Positive for fever. Negative for chills.   HENT: Positive for sore throat and trouble swallowing (Pain with swallowing).    Eyes: Negative for pain.   Respiratory: Negative for shortness of breath.    Cardiovascular: Negative for chest pain.   Gastrointestinal: Negative for abdominal pain, nausea and vomiting.   Genitourinary: Negative for hematuria.   Musculoskeletal: Negative for myalgias.   Skin: Negative for rash.   Neurological: Negative for dizziness.      Objective:   /88   Pulse (!) 107   Temp 37.1 °C (98.7 °F) (Temporal)   Resp 16   Ht 1.575 m (5' 2\")   Wt 63.5 kg (140 lb)   LMP  (LMP Unknown)   SpO2 95%   BMI 25.61 kg/m²   Physical Exam  Constitutional:       General: She is not in acute distress.     Appearance: Normal appearance. She is well-developed.   HENT:      Head: Normocephalic and atraumatic.      Nose: Mucosal edema and rhinorrhea present.      Right Turbinates: Swollen.      Left Turbinates: Swollen.      Mouth/Throat:      Pharynx: Oropharyngeal exudate and posterior oropharyngeal erythema present. No uvula swelling.      Tonsils: Tonsillar exudate present. No tonsillar abscesses.   Eyes:      Conjunctiva/sclera: Conjunctivae normal.      Pupils: Pupils are equal, round, and reactive to light.   Cardiovascular:      Rate and Rhythm: Normal rate and regular rhythm.    "   Heart sounds: No murmur.   Pulmonary:      Effort: Pulmonary effort is normal. No respiratory distress.      Breath sounds: Normal breath sounds.   Abdominal:      General: There is no distension.      Palpations: Abdomen is soft.      Tenderness: There is no tenderness.   Musculoskeletal: Normal range of motion.   Skin:     General: Skin is warm and dry.   Neurological:      General: No focal deficit present.      Mental Status: She is alert and oriented to person, place, and time. Mental status is at baseline.      Gait: Gait (gait at baseline) normal.   Psychiatric:         Judgment: Judgment normal.       Rapid Strept: negative       Assessment/Plan:   1. Tonsillitis  - amoxicillin (AMOXIL) 500 MG Cap; Take 1 Cap by mouth 3 times a day.  Dispense: 30 Cap; Refill: 0    2. Odynophagia  - dexamethasone (DECADRON) 4 MG Tab; Take 2.5 Tabs by mouth Once for 1 dose.  Dispense: 3 Tab; Refill: 0    Differential diagnosis, natural history, supportive care, and indications for immediate follow-up discussed.    Advised the patient to follow-up with the primary care physician for recheck, reevaluation, and consideration of further management.    Discussed close monitoring, return precautions, and supportive measures including maintaining adequate fluid hydration and caloric intake, relative rest and OTC symptom management including acetaminophen as needed for pain and/or fever.

## 2020-10-06 ENCOUNTER — HOSPITAL ENCOUNTER (OUTPATIENT)
Dept: LAB | Facility: MEDICAL CENTER | Age: 50
End: 2020-10-06
Attending: FAMILY MEDICINE
Payer: COMMERCIAL

## 2020-10-06 LAB — TSH SERPL DL<=0.005 MIU/L-ACNC: 0.49 UIU/ML (ref 0.38–5.33)

## 2020-10-06 PROCEDURE — 36415 COLL VENOUS BLD VENIPUNCTURE: CPT

## 2020-10-06 PROCEDURE — 84443 ASSAY THYROID STIM HORMONE: CPT

## 2021-03-09 ENCOUNTER — HOSPITAL ENCOUNTER (OUTPATIENT)
Dept: LAB | Facility: MEDICAL CENTER | Age: 51
End: 2021-03-09
Attending: FAMILY MEDICINE
Payer: COMMERCIAL

## 2021-03-09 PROCEDURE — 83036 HEMOGLOBIN GLYCOSYLATED A1C: CPT

## 2021-03-09 PROCEDURE — 36415 COLL VENOUS BLD VENIPUNCTURE: CPT

## 2021-03-09 PROCEDURE — 84443 ASSAY THYROID STIM HORMONE: CPT

## 2021-03-09 PROCEDURE — 80061 LIPID PANEL: CPT

## 2021-03-09 PROCEDURE — 80053 COMPREHEN METABOLIC PANEL: CPT

## 2021-03-09 PROCEDURE — 85025 COMPLETE CBC W/AUTO DIFF WBC: CPT

## 2021-03-10 LAB
ALBUMIN SERPL BCP-MCNC: 4.5 G/DL (ref 3.2–4.9)
ALBUMIN/GLOB SERPL: 1.5 G/DL
ALP SERPL-CCNC: 115 U/L (ref 30–99)
ALT SERPL-CCNC: 18 U/L (ref 2–50)
ANION GAP SERPL CALC-SCNC: 12 MMOL/L (ref 7–16)
AST SERPL-CCNC: 19 U/L (ref 12–45)
BASOPHILS # BLD AUTO: 0.7 % (ref 0–1.8)
BASOPHILS # BLD: 0.05 K/UL (ref 0–0.12)
BILIRUB SERPL-MCNC: 0.4 MG/DL (ref 0.1–1.5)
BUN SERPL-MCNC: 11 MG/DL (ref 8–22)
CALCIUM SERPL-MCNC: 9.4 MG/DL (ref 8.5–10.5)
CHLORIDE SERPL-SCNC: 106 MMOL/L (ref 96–112)
CHOLEST SERPL-MCNC: 183 MG/DL (ref 100–199)
CO2 SERPL-SCNC: 23 MMOL/L (ref 20–33)
CREAT SERPL-MCNC: 0.64 MG/DL (ref 0.5–1.4)
EOSINOPHIL # BLD AUTO: 0.35 K/UL (ref 0–0.51)
EOSINOPHIL NFR BLD: 4.8 % (ref 0–6.9)
ERYTHROCYTE [DISTWIDTH] IN BLOOD BY AUTOMATED COUNT: 42.5 FL (ref 35.9–50)
EST. AVERAGE GLUCOSE BLD GHB EST-MCNC: 111 MG/DL
FASTING STATUS PATIENT QL REPORTED: NORMAL
GLOBULIN SER CALC-MCNC: 3.1 G/DL (ref 1.9–3.5)
GLUCOSE SERPL-MCNC: 91 MG/DL (ref 65–99)
HBA1C MFR BLD: 5.5 % (ref 4–5.6)
HCT VFR BLD AUTO: 45.7 % (ref 37–47)
HDLC SERPL-MCNC: 32 MG/DL
HGB BLD-MCNC: 15.2 G/DL (ref 12–16)
IMM GRANULOCYTES # BLD AUTO: 0.03 K/UL (ref 0–0.11)
IMM GRANULOCYTES NFR BLD AUTO: 0.4 % (ref 0–0.9)
LDLC SERPL CALC-MCNC: 105 MG/DL
LYMPHOCYTES # BLD AUTO: 3.33 K/UL (ref 1–4.8)
LYMPHOCYTES NFR BLD: 45.2 % (ref 22–41)
MCH RBC QN AUTO: 29.8 PG (ref 27–33)
MCHC RBC AUTO-ENTMCNC: 33.3 G/DL (ref 33.6–35)
MCV RBC AUTO: 89.6 FL (ref 81.4–97.8)
MONOCYTES # BLD AUTO: 0.53 K/UL (ref 0–0.85)
MONOCYTES NFR BLD AUTO: 7.2 % (ref 0–13.4)
NEUTROPHILS # BLD AUTO: 3.07 K/UL (ref 2–7.15)
NEUTROPHILS NFR BLD: 41.7 % (ref 44–72)
NRBC # BLD AUTO: 0 K/UL
NRBC BLD-RTO: 0 /100 WBC
PLATELET # BLD AUTO: 249 K/UL (ref 164–446)
PMV BLD AUTO: 11 FL (ref 9–12.9)
POTASSIUM SERPL-SCNC: 3.9 MMOL/L (ref 3.6–5.5)
PROT SERPL-MCNC: 7.6 G/DL (ref 6–8.2)
RBC # BLD AUTO: 5.1 M/UL (ref 4.2–5.4)
SODIUM SERPL-SCNC: 141 MMOL/L (ref 135–145)
TRIGL SERPL-MCNC: 229 MG/DL (ref 0–149)
TSH SERPL DL<=0.005 MIU/L-ACNC: 1.16 UIU/ML (ref 0.38–5.33)
WBC # BLD AUTO: 7.4 K/UL (ref 4.8–10.8)

## 2021-11-08 ENCOUNTER — OFFICE VISIT (OUTPATIENT)
Dept: URGENT CARE | Facility: PHYSICIAN GROUP | Age: 51
End: 2021-11-08
Payer: COMMERCIAL

## 2021-11-08 ENCOUNTER — APPOINTMENT (OUTPATIENT)
Dept: RADIOLOGY | Facility: IMAGING CENTER | Age: 51
End: 2021-11-08
Attending: FAMILY MEDICINE
Payer: COMMERCIAL

## 2021-11-08 ENCOUNTER — HOSPITAL ENCOUNTER (OUTPATIENT)
Dept: LAB | Facility: MEDICAL CENTER | Age: 51
End: 2021-11-08
Attending: FAMILY MEDICINE
Payer: COMMERCIAL

## 2021-11-08 VITALS
HEIGHT: 63 IN | DIASTOLIC BLOOD PRESSURE: 94 MMHG | SYSTOLIC BLOOD PRESSURE: 132 MMHG | BODY MASS INDEX: 27.82 KG/M2 | RESPIRATION RATE: 12 BRPM | HEART RATE: 84 BPM | OXYGEN SATURATION: 98 % | TEMPERATURE: 97.2 F | WEIGHT: 157 LBS

## 2021-11-08 DIAGNOSIS — S99.922A INJURY OF LEFT FOOT, INITIAL ENCOUNTER: ICD-10-CM

## 2021-11-08 DIAGNOSIS — S92.355A CLOSED NONDISPLACED FRACTURE OF FIFTH METATARSAL BONE OF LEFT FOOT, INITIAL ENCOUNTER: ICD-10-CM

## 2021-11-08 DIAGNOSIS — S99.912A INJURY OF LEFT ANKLE, INITIAL ENCOUNTER: ICD-10-CM

## 2021-11-08 LAB
ALBUMIN SERPL BCP-MCNC: 4.4 G/DL (ref 3.2–4.9)
ALBUMIN/GLOB SERPL: 1.5 G/DL
ALP SERPL-CCNC: 115 U/L (ref 30–99)
ALT SERPL-CCNC: 26 U/L (ref 2–50)
ANION GAP SERPL CALC-SCNC: 9 MMOL/L (ref 7–16)
AST SERPL-CCNC: 25 U/L (ref 12–45)
BASOPHILS # BLD AUTO: 0.5 % (ref 0–1.8)
BASOPHILS # BLD: 0.04 K/UL (ref 0–0.12)
BILIRUB SERPL-MCNC: 0.4 MG/DL (ref 0.1–1.5)
BUN SERPL-MCNC: 12 MG/DL (ref 8–22)
CALCIUM SERPL-MCNC: 9.6 MG/DL (ref 8.5–10.5)
CHLORIDE SERPL-SCNC: 106 MMOL/L (ref 96–112)
CHOLEST SERPL-MCNC: 186 MG/DL (ref 100–199)
CO2 SERPL-SCNC: 26 MMOL/L (ref 20–33)
CREAT SERPL-MCNC: 0.71 MG/DL (ref 0.5–1.4)
EOSINOPHIL # BLD AUTO: 0.3 K/UL (ref 0–0.51)
EOSINOPHIL NFR BLD: 3.9 % (ref 0–6.9)
ERYTHROCYTE [DISTWIDTH] IN BLOOD BY AUTOMATED COUNT: 43.6 FL (ref 35.9–50)
EST. AVERAGE GLUCOSE BLD GHB EST-MCNC: 103 MG/DL
FASTING STATUS PATIENT QL REPORTED: NORMAL
GLOBULIN SER CALC-MCNC: 3 G/DL (ref 1.9–3.5)
GLUCOSE SERPL-MCNC: 112 MG/DL (ref 65–99)
HBA1C MFR BLD: 5.2 % (ref 4–5.6)
HCT VFR BLD AUTO: 43.3 % (ref 37–47)
HDLC SERPL-MCNC: 33 MG/DL
HGB BLD-MCNC: 14.9 G/DL (ref 12–16)
IMM GRANULOCYTES # BLD AUTO: 0.03 K/UL (ref 0–0.11)
IMM GRANULOCYTES NFR BLD AUTO: 0.4 % (ref 0–0.9)
LDLC SERPL CALC-MCNC: 100 MG/DL
LYMPHOCYTES # BLD AUTO: 3 K/UL (ref 1–4.8)
LYMPHOCYTES NFR BLD: 39 % (ref 22–41)
MCH RBC QN AUTO: 31 PG (ref 27–33)
MCHC RBC AUTO-ENTMCNC: 34.4 G/DL (ref 33.6–35)
MCV RBC AUTO: 90.2 FL (ref 81.4–97.8)
MONOCYTES # BLD AUTO: 0.79 K/UL (ref 0–0.85)
MONOCYTES NFR BLD AUTO: 10.3 % (ref 0–13.4)
NEUTROPHILS # BLD AUTO: 3.54 K/UL (ref 2–7.15)
NEUTROPHILS NFR BLD: 45.9 % (ref 44–72)
NRBC # BLD AUTO: 0 K/UL
NRBC BLD-RTO: 0 /100 WBC
PLATELET # BLD AUTO: 251 K/UL (ref 164–446)
PMV BLD AUTO: 9.9 FL (ref 9–12.9)
POTASSIUM SERPL-SCNC: 4 MMOL/L (ref 3.6–5.5)
PROT SERPL-MCNC: 7.4 G/DL (ref 6–8.2)
RBC # BLD AUTO: 4.8 M/UL (ref 4.2–5.4)
SODIUM SERPL-SCNC: 141 MMOL/L (ref 135–145)
TRIGL SERPL-MCNC: 265 MG/DL (ref 0–149)
TSH SERPL DL<=0.005 MIU/L-ACNC: 0.71 UIU/ML (ref 0.38–5.33)
WBC # BLD AUTO: 7.7 K/UL (ref 4.8–10.8)

## 2021-11-08 PROCEDURE — 73630 X-RAY EXAM OF FOOT: CPT | Mod: TC,FY,LT | Performed by: FAMILY MEDICINE

## 2021-11-08 PROCEDURE — 80053 COMPREHEN METABOLIC PANEL: CPT

## 2021-11-08 PROCEDURE — 73610 X-RAY EXAM OF ANKLE: CPT | Mod: TC,FY,LT | Performed by: FAMILY MEDICINE

## 2021-11-08 PROCEDURE — 99214 OFFICE O/P EST MOD 30 MIN: CPT | Performed by: FAMILY MEDICINE

## 2021-11-08 PROCEDURE — 85025 COMPLETE CBC W/AUTO DIFF WBC: CPT

## 2021-11-08 PROCEDURE — 84443 ASSAY THYROID STIM HORMONE: CPT

## 2021-11-08 PROCEDURE — 80061 LIPID PANEL: CPT

## 2021-11-08 PROCEDURE — 83036 HEMOGLOBIN GLYCOSYLATED A1C: CPT

## 2021-11-08 PROCEDURE — 36415 COLL VENOUS BLD VENIPUNCTURE: CPT

## 2021-11-08 RX ORDER — PYRIDOSTIGMINE BROMIDE 60 MG/1
60 TABLET ORAL
COMMUNITY
End: 2021-11-08

## 2021-11-08 RX ORDER — AMITRIPTYLINE HYDROCHLORIDE 10 MG/1
10 TABLET, FILM COATED ORAL
COMMUNITY
Start: 2021-10-18 | End: 2021-11-08

## 2021-11-08 RX ORDER — IBUPROFEN 800 MG/1
TABLET ORAL
Qty: 60 TABLET | Refills: 1 | Status: SHIPPED | OUTPATIENT
Start: 2021-11-08

## 2021-11-08 RX ORDER — LEVOTHYROXINE SODIUM 88 MCG
TABLET ORAL
COMMUNITY
Start: 2021-10-18

## 2021-11-08 RX ORDER — LEVOTHYROXINE SODIUM 0.12 MG/1
125 TABLET ORAL DAILY
COMMUNITY
End: 2021-11-08

## 2021-11-08 RX ORDER — AMITRIPTYLINE HYDROCHLORIDE 10 MG/1
TABLET, FILM COATED ORAL
COMMUNITY
Start: 2021-10-18

## 2021-11-08 ASSESSMENT — FIBROSIS 4 INDEX: FIB4 SCORE: 0.9

## 2021-11-08 NOTE — PROGRESS NOTES
Chief Complaint:    Chief Complaint   Patient presents with   • Ankle Injury     L ankle injury, x2days        History of Present Illness:    Left ankle and left foot hurt and are swollen with mild bruising due to stepping on a rock on 11/6/21 and twisting left ankle and left foot. Took Ibuprofen with some help of the pain.      Past Medical History:    Past Medical History:   Diagnosis Date   • Calculus, urinary    • Hypothyroid    • Myasthenia gravis (HCC)    • Pain 04/11/2018    right flank, 7/10     Past Surgical History:    Past Surgical History:   Procedure Laterality Date   • PERCUTANEOUS NEPHROSTOLITHOTOMY Right 4/13/2018    Procedure: PERCUTANEOUS NEPHROSTOLITHOTOMY;  Surgeon: Darwin Robb M.D.;  Location: SURGERY [facility];  Service: Urology   • OTHER Right 04/12/2018    nephrostomy tube placement   • UROLOGY SURGERY  2012    kidney stones   • APPENDECTOMY       Social History:    Social History     Socioeconomic History   • Marital status:      Spouse name: Not on file   • Number of children: Not on file   • Years of education: Not on file   • Highest education level: Not on file   Occupational History   • Not on file   Tobacco Use   • Smoking status: Current Every Day Smoker     Packs/day: 0.50     Years: 20.00     Pack years: 10.00     Types: Cigarettes   • Smokeless tobacco: Never Used   Substance and Sexual Activity   • Alcohol use: No   • Drug use: No   • Sexual activity: Not on file   Other Topics Concern   • Not on file   Social History Narrative   • Not on file     Social Determinants of Health     Financial Resource Strain:    • Difficulty of Paying Living Expenses: Not on file   Food Insecurity:    • Worried About Running Out of Food in the Last Year: Not on file   • Ran Out of Food in the Last Year: Not on file   Transportation Needs:    • Lack of Transportation (Medical): Not on file   • Lack of Transportation (Non-Medical): Not on file   Physical Activity:    • Days of  "Exercise per Week: Not on file   • Minutes of Exercise per Session: Not on file   Stress:    • Feeling of Stress : Not on file   Social Connections:    • Frequency of Communication with Friends and Family: Not on file   • Frequency of Social Gatherings with Friends and Family: Not on file   • Attends [employer] Services: Not on file   • Active Member of Clubs or Organizations: Not on file   • Attends Club or Organization Meetings: Not on file   • Marital Status: Not on file   Intimate Partner Violence:    • Fear of Current or Ex-Partner: Not on file   • Emotionally Abused: Not on file   • Physically Abused: Not on file   • Sexually Abused: Not on file   Housing Stability:    • Unable to Pay for Housing in the Last Year: Not on file   • Number of Places Lived in the Last Year: Not on file   • Unstable Housing in the Last Year: Not on file     Family History:    No family history on file.    Medications:    Current Outpatient Medications on File Prior to Visit   Medication Sig Dispense Refill   • SYNTHROID 88 MCG Tab      • amitriptyline (ELAVIL) 10 MG Tab      • pyridostigmine (MESTINON) 60 MG Tab Take 60 mg by mouth 2 times a day.       No current facility-administered medications on file prior to visit.     Allergies:    No Known Allergies      Vitals:    Vitals:    11/08/21 1402   BP: 132/94   Pulse: 84   Resp: 12   Temp: 36.2 °C (97.2 °F)   TempSrc: Temporal   SpO2: 98%   Weight: 71.2 kg (157 lb)   Height: 1.6 m (5' 3\")       Physical Exam:    Constitutional: Vital signs reviewed. Appears well-developed and well-nourished. No acute distress.   Eyes: Sclera white, conjunctivae clear.  ENT: External ears normal. Hearing normal.  Cardiovascular: Peripheral pulses 2+.   Pulmonary/Chest: Respirations non-labored.  Musculoskeletal: Soft tissue swelling lateral aspect of left ankle and dorsolateral aspect of left foot with associated tenderness to palpation. Mild bruising proximal lateral left foot.  Neurological: Alert " and oriented to person, place, and time. Muscle tone normal. Coordination normal. Light touch and sensation normal.   Skin: No rashes or lesions. Warm, dry, normal turgor.  Psychiatric: Normal mood and affect. Behavior is normal. Judgment and thought content normal.       Diagnostics:    DX-ANKLE 3+ VIEWS LEFT  Order: [id number]   Status: Final result     Visible to patient: No (scheduled for 11/9/2021 12:32 PM)     Next appt: None     Dx: Injury of left ankle, initial encounter     0 Result Notes    Details    Reading Physician Reading Date Result Priority   Eloisa Cowan M.D.  [phone] 11/8/2021 Urgent Care     Narrative & Impression     11/8/2021 2:14 PM     HISTORY/REASON FOR EXAM:  Pain/Deformity Following Trauma; Room 2. Twisted left foot and ankle stepping on a rock on 11/6/21..        TECHNIQUE/EXAM DESCRIPTION AND NUMBER OF VIEWS:  3 views of the LEFT ankle.     COMPARISON: None.     FINDINGS:  The alignment and mineralization are normal. There is a nondisplaced fracture of the proximal fifth metatarsal. The ankle mortise appears maintained. No fractures are present about the ankle joint itself. Small plantar and posterior calcaneal spurs are   also appreciated.     IMPRESSION:     1.  Nondisplaced proximal fifth metatarsal fracture with surrounding soft tissue swelling.     DX-FOOT-COMPLETE 3+ LEFT  Order: [id number]   Status: Final result     Visible to patient: No (scheduled for 11/9/2021 12:36 PM)     Next appt: None     Dx: Injury of left foot, initial encounter     0 Result Notes    Details    Reading Physician Reading Date Result Priority   Eloisa Cowan M.D.  [phone] 11/8/2021 Urgent Care     Narrative & Impression     11/8/2021 2:14 PM     HISTORY/REASON FOR EXAM:  Pain/Deformity Following Trauma; Room 2. Twisted left foot and ankle stepping on a rock on 11/6/21.        TECHNIQUE/EXAM DESCRIPTION AND NUMBER OF VIEWS:  3 views of the LEFT foot.     COMPARISON:  None.     FINDINGS: There is a  transversely oriented nondisplaced fracture of the proximal fifth metatarsal. No other fractures or dislocations are appreciated. First metatarsus varus and hallux valgus are appreciated. Cystic changes in the first metatarsal head   are prominent. There are small calcaneal spurs.     IMPRESSION:     Nondisplaced fracture of the proximal fifth metatarsal.     I personally reviewed the images. Images and Rad reports reviewed with her and copies of reports to her.      Medical Decision Makin. Injury of left ankle, initial encounter  - DX-ANKLE 3+ VIEWS LEFT; Future    2. Injury of left foot, initial encounter  - DX-FOOT-COMPLETE 3+ LEFT; Future    3. Closed nondisplaced fracture of fifth metatarsal bone of left foot, initial encounter  - Referral to Sports Medicine  - ibuprofen (MOTRIN) 800 MG Tab; 1 TAB BY MOUTH EVERY 8 HOURS ONLY IF NEEDED FOR PAIN AND/OR SWELLING TO HELP INFLAMMATION. TAKE WITH FOOD.  Dispense: 60 Tablet; Refill: 1      Work note given - she has a broken bone in left foot and should wear a walking boot while working.    Discussed with her DDX, management options, and risks, benefits, and alternatives to treatment plan agreed upon.    Left ankle and left foot hurt and are swollen with mild bruising due to stepping on a rock on 21 and twisting left ankle and left foot. Took Ibuprofen with some help of the pain.    Soft tissue swelling lateral aspect of left ankle and dorsolateral aspect of left foot with associated tenderness to palpation. Mild bruising proximal lateral left foot.    Left ankle and left foot x-rays today: Nondisplaced proximal fifth metatarsal fracture with surrounding soft tissue swelling.    Recommended relative rest. Short walking boot provided to use as needed. She declines crutches - she does not feel she will be able to use correctly.    Elevate left ankle and left foot as needed for pain and/or swelling.    Referral to Sports Medicine ordered for follow-up.    She  asks for Ibuprofen 800 mg Rx. This was prescribed.    She will return to urgent care if needed.

## 2021-11-08 NOTE — LETTER
November 8, 2021         Patient: Patti Cooley   YOB: 1970   Date of Visit: 11/8/2021           To Whom it May Concern:    Patti Cooley was seen in my clinic on 11/8/2021.     She has a broken bone in left foot and should wear a walking boot while working.    If you have any questions or concerns, please don't hesitate to call.        Sincerely,           Steve Abdalla M.D.  Electronically Signed

## 2024-12-23 ENCOUNTER — TELEPHONE (OUTPATIENT)
Dept: HEALTH INFORMATION MANAGEMENT | Facility: OTHER | Age: 54
End: 2024-12-23
Payer: COMMERCIAL

## 2025-03-13 ENCOUNTER — APPOINTMENT (OUTPATIENT)
Dept: NEUROLOGY | Facility: MEDICAL CENTER | Age: 55
End: 2025-03-13
Attending: SPECIALIST
Payer: OTHER GOVERNMENT

## 2025-06-05 ENCOUNTER — OFFICE VISIT (OUTPATIENT)
Dept: NEUROLOGY | Facility: MEDICAL CENTER | Age: 55
End: 2025-06-05
Attending: STUDENT IN AN ORGANIZED HEALTH CARE EDUCATION/TRAINING PROGRAM
Payer: COMMERCIAL

## 2025-06-05 VITALS
BODY MASS INDEX: 24.84 KG/M2 | WEIGHT: 140.21 LBS | TEMPERATURE: 98 F | DIASTOLIC BLOOD PRESSURE: 88 MMHG | HEIGHT: 63 IN | HEART RATE: 86 BPM | OXYGEN SATURATION: 97 % | SYSTOLIC BLOOD PRESSURE: 134 MMHG | RESPIRATION RATE: 16 BRPM

## 2025-06-05 DIAGNOSIS — G70.00 MYASTHENIA GRAVIS (HCC): Primary | ICD-10-CM

## 2025-06-05 PROCEDURE — 99204 OFFICE O/P NEW MOD 45 MIN: CPT | Performed by: STUDENT IN AN ORGANIZED HEALTH CARE EDUCATION/TRAINING PROGRAM

## 2025-06-05 PROCEDURE — 99215 OFFICE O/P EST HI 40 MIN: CPT

## 2025-06-05 PROCEDURE — 3075F SYST BP GE 130 - 139MM HG: CPT | Performed by: STUDENT IN AN ORGANIZED HEALTH CARE EDUCATION/TRAINING PROGRAM

## 2025-06-05 PROCEDURE — 3079F DIAST BP 80-89 MM HG: CPT | Performed by: STUDENT IN AN ORGANIZED HEALTH CARE EDUCATION/TRAINING PROGRAM

## 2025-06-05 RX ORDER — PREDNISONE 5 MG/1
5 TABLET ORAL DAILY
Qty: 30 TABLET | Refills: 0 | Status: SHIPPED | OUTPATIENT
Start: 2025-06-05

## 2025-06-05 RX ORDER — ROSUVASTATIN CALCIUM 10 MG/1
10 TABLET, COATED ORAL DAILY
COMMUNITY

## 2025-06-05 RX ORDER — DIPHENHYDRAMINE HYDROCHLORIDE 50 MG/ML
25 INJECTION, SOLUTION INTRAMUSCULAR; INTRAVENOUS PRN
OUTPATIENT
Start: 2025-06-16

## 2025-06-05 RX ORDER — 0.9 % SODIUM CHLORIDE 0.9 %
10 VIAL (ML) INJECTION PRN
OUTPATIENT
Start: 2025-06-16

## 2025-06-05 RX ORDER — LORAZEPAM 0.5 MG/1
0.5 TABLET ORAL PRN
OUTPATIENT
Start: 2025-06-16

## 2025-06-05 RX ORDER — SODIUM CHLORIDE 9 MG/ML
1000 INJECTION, SOLUTION INTRAVENOUS PRN
OUTPATIENT
Start: 2025-06-16

## 2025-06-05 RX ORDER — MEPERIDINE HYDROCHLORIDE 25 MG/ML
25 INJECTION INTRAMUSCULAR; INTRAVENOUS; SUBCUTANEOUS PRN
OUTPATIENT
Start: 2025-06-16

## 2025-06-05 RX ORDER — LORAZEPAM 2 MG/ML
0.5 INJECTION INTRAMUSCULAR PRN
OUTPATIENT
Start: 2025-06-16

## 2025-06-05 RX ORDER — ONDANSETRON 2 MG/ML
8 INJECTION INTRAMUSCULAR; INTRAVENOUS PRN
OUTPATIENT
Start: 2025-06-16

## 2025-06-05 RX ORDER — ALBUTEROL SULFATE 5 MG/ML
2.5 SOLUTION RESPIRATORY (INHALATION) PRN
OUTPATIENT
Start: 2025-06-16

## 2025-06-05 RX ORDER — ONDANSETRON 8 MG/1
8 TABLET, ORALLY DISINTEGRATING ORAL PRN
OUTPATIENT
Start: 2025-06-16

## 2025-06-05 RX ORDER — EPINEPHRINE 1 MG/ML(1)
0.5 AMPUL (ML) INJECTION PRN
OUTPATIENT
Start: 2025-06-16

## 2025-06-05 RX ORDER — DIPHENHYDRAMINE HCL 25 MG
25 TABLET ORAL ONCE
OUTPATIENT
Start: 2025-06-16 | End: 2025-06-16

## 2025-06-05 RX ORDER — 0.9 % SODIUM CHLORIDE 0.9 %
VIAL (ML) INJECTION PRN
OUTPATIENT
Start: 2025-06-16

## 2025-06-05 RX ORDER — BENAZEPRIL HYDROCHLORIDE 10 MG/1
10 TABLET ORAL DAILY
COMMUNITY

## 2025-06-05 RX ORDER — METHYLPREDNISOLONE SODIUM SUCCINATE 125 MG/2ML
125 INJECTION, POWDER, LYOPHILIZED, FOR SOLUTION INTRAMUSCULAR; INTRAVENOUS PRN
OUTPATIENT
Start: 2025-06-16

## 2025-06-05 RX ORDER — 0.9 % SODIUM CHLORIDE 0.9 %
3 VIAL (ML) INJECTION PRN
OUTPATIENT
Start: 2025-06-16

## 2025-06-05 RX ORDER — HYDROCORTISONE SODIUM SUCCINATE 100 MG/2ML
100 INJECTION INTRAMUSCULAR; INTRAVENOUS ONCE
OUTPATIENT
Start: 2025-06-16 | End: 2025-06-16

## 2025-06-05 RX ORDER — ACETAMINOPHEN 325 MG/1
650 TABLET ORAL PRN
OUTPATIENT
Start: 2025-06-16

## 2025-06-05 RX ORDER — ACETAMINOPHEN 325 MG/1
650 TABLET ORAL ONCE
OUTPATIENT
Start: 2025-06-16 | End: 2025-06-16

## 2025-06-05 RX ORDER — SODIUM CHLORIDE 9 MG/ML
INJECTION, SOLUTION INTRAVENOUS CONTINUOUS
OUTPATIENT
Start: 2025-06-16

## 2025-06-05 ASSESSMENT — PATIENT HEALTH QUESTIONNAIRE - PHQ9: CLINICAL INTERPRETATION OF PHQ2 SCORE: 0

## 2025-06-05 NOTE — PROGRESS NOTES
[facility] NEUROLOGY    Date of Service: 06/05/25    Referred by: Macho Gipson II, M.D.  [address] [facility] [place] [address]      Reason for the referral/Chief Complaint:  Myasthenia gravis    History of present illness (HPI)   Patti Cooley is a 54 y.o. woman with history of myasthenia gravis (unknown if acetylcholine receptor positive), hypothyroidism, migraine headaches, and hypertension who presents to establish care for her myasthenia gravis.    She was diagnosed when she was 21 years old in the [facility].  She had generalized weakness, extreme fatigue and intermittent double vision.  She does not remember how was she diagnosed.  She has never had a CT of her chest.    She was previously following with Dr. Luis Carpenter and there were plans to start IVIG but she did not like the clinic coordination so she did not continue with the process.     Since her diagnosis, she has been taking Mestinon daily.  She has only taken Mestinon when she feels that her symptoms are worse.  This has been prescribed by her primary care physician.  She has never had any hospitalizations for her myasthenia gravis.    In general, she continues to feel fatigue, sometimes she has nasal speech, sometimes she has double vision, she has occasional choking episodes and she feels generalized weakness.    Medical history  Past Medical History[1]    Surgical history  Past Surgical History[2]    Relevant family history  None     Social history  Works in [employer]     Tobacco: 3/day   Alcohol: None    Medications  Active Medications[3]    Physical exam    Vitals:    06/05/25 1503   BP: 134/88   Pulse: 86   Resp: 16   Temp: 36.7 °C (98 °F)   SpO2: 97%       General: Appears comfortable     Neurologic exam:    Mental Status: Alert & oriented to person, place, time, and situation. Memory and recall are grossly intact. Language testing shows normal naming, repetition, fluency, and comprehension.    Cranial  Nerves: Pupils are equal and reactive to light. Eye movements are normal. No nystagmus noted. The funduscopic exam was deferred. Facial activation is symmetric and full. Hearing is intact to conversation. Shoulder shrug is 5/5 power bilaterally. Tongue is midline.    Neuromuscular exam MG focused:     Tone: Normal  Fasciculations: None  Atrophy: None  Deformity: None  Rising from chair: Stands from the seated position independently without arm support    Coordination: Finger-to-nose movements are smooth, without ataxia or dysmetria.    Gait: Gait is stable with narrow base and normal stride length.     Double vision on left lateral gaze, spontaneous ptosis on the right eye, dysarthria at #20 with a single breath count, no fatigability on proximal muscles - hip flexion and shoulder abduction.    Relevant laboratory results:  No acetylcholine receptor panel    Relevant Imaging results:   No CT chest    Assessment and Plan:   54 y.o. woman with history of myasthenia gravis (unknown if acetylcholine receptor positive, has never had CT chest), hypothyroidism, migraine headaches, and hypertension who presents to establish care for her myasthenia gravis.      Her physical exam is remarkable for right eye ptosis, double vision on horizontal gaze to the left, mild nasal speech, no significant fatigability in proximal muscles.    Plan to start IVIG.  She will start taking low-dose prednisone until then.  I will also order CT chest to look for thymoma.  I explained to her that there are some medications she needs to avoid.  I gave her a list.    For her migraine headache she will continue amitriptyline.  I explained that she needs to take it every day and not as needed as she has been doing.    1. Myasthenia gravis (HCC)  - CT-CHEST (THORAX) WITH & W/O; Future  - predniSONE (DELTASONE) 5 MG Tab; Take 1 Tablet by mouth every day.  Dispense: 30 Tablet; Refill: 0  - ACETYLCHOL RECEP BIND AB W/RFX  - IGA SERUM QUANT;  Future    Other orders  - rosuvastatin (CRESTOR) 10 MG Tab; Take 10 mg by mouth every day.  - benazepril (LOTENSIN) 10 MG Tab; Take 10 mg by mouth every day. FOR 90 DAYS       Return to the clinic in 2 months      I spent a total of 45 minutes on this patient's care on the day of their visit excluding time spent related to any billed procedures. This time includes time spent with the patient as well as time spent documenting in the medical record, reviewing patient's records and tests, obtaining history, placing orders, communicating with other healthcare professionals, counseling the patient, family, or caregiver, and/or care coordination for the diagnoses above          Anila Fernandez MD  Neurology  Clinical Neurophysiology   [facility]          [1]   Past Medical History:  Diagnosis Date    Calculus, urinary     Hypothyroid     Myasthenia gravis (HCC)     Pain 04/11/2018    right flank, 7/10   [2]   Past Surgical History:  Procedure Laterality Date    PERCUTANEOUS NEPHROSTOLITHOTOMY Right 4/13/2018    Procedure: PERCUTANEOUS NEPHROSTOLITHOTOMY;  Surgeon: Darwin Robb M.D.;  Location: SURGERY [facility];  Service: Urology    OTHER Right 04/12/2018    nephrostomy tube placement    UROLOGY SURGERY  2012    kidney stones    APPENDECTOMY     [3]   Outpatient Medications Marked as Taking for the 6/5/25 encounter (Office Visit) with Anila Shepherd M.D.   Medication Sig Dispense Refill    rosuvastatin (CRESTOR) 10 MG Tab Take 10 mg by mouth every day.      benazepril (LOTENSIN) 10 MG Tab Take 10 mg by mouth every day. FOR 90 DAYS      predniSONE (DELTASONE) 5 MG Tab Take 1 Tablet by mouth every day. 30 Tablet 0    SYNTHROID 88 MCG Tab       amitriptyline (ELAVIL) 10 MG Tab       ibuprofen (MOTRIN) 800 MG Tab 1 TAB BY MOUTH EVERY 8 HOURS ONLY IF NEEDED FOR PAIN AND/OR SWELLING TO HELP INFLAMMATION. TAKE WITH FOOD. 60 Tablet 1    pyridostigmine (MESTINON) 60 MG Tab Take 60 mg  by mouth 2 times a day.

## 2025-06-05 NOTE — PATIENT INSTRUCTIONS
- Take the Mestinon 60 mg every 4-6 hours.    Drugs to avoid:   - Telithromycin: antibiotic for community  acquired pneumonia. The [place] FDA has  designated a “black box” warning for this drug  in MG. Should not be used in MG.   Fluoroquinolones (e.g., ciprofloxacin,  moxifloxacin and levofloxacin): commonly  prescribed broadspectrum antibiotics that are  associated with worsening MG. The [place] FDA  has designated a “black box” warning for these  agents in MG. Use cautiously, if at all.   Botulinum toxin: Avoid.   D-penicillamine: used for Luis E disease  and rarely for rheumatoid arthritis. Strongly  associated with causing MG. Avoid.   Quinine: occasionally used for leg cramps.  Use prohibited except in malaria in [place].   Magnesium: potentially dangerous if given  intravenously, i.e. for eclampsia during late  pregnancy or for hypomagnesemia. Use only  if absolutely necessary and observe for  Worsening  Macrolide antibiotics (e.g., erythromycin,  azithromycin, clarithromycin): commonly  prescribed antibiotics for gram-positive  bacterial infections. May worsen MG.  Use cautiously, if at all.   Aminoglycoside antibiotics  (e.g., gentamycin, neomycin, tobramycin):  used for gram-negative bacterial infections.  May worsen MG. Use cautiously if no  alternative treatment available.   Corticosteroids: A standard treatment for  MG, but may cause transient worsening within  the first two weeks. Monitor carefully for this  possibility.   Procainamide: used for irregular heart  rhythm. May worsen MG. Use with caution.   Desferrioxamine: Chelating agent used for  hemochromatosis. May worsen MG.   Beta-blockers: commonly prescribed for  hypertension, heart disease and migraine but  potentially dangerous in MG. May worsen MG.  Use cautiously.   Statins (e.g., atorvastatin, pravastatin,  rosuvastatin, simvastatin): used to reduce  serum cholesterol. May worsen or precipitate  MG. Use cautiously if indicated and at  lowest dose  needed.   Iodinated radiologic contrast agents: older  reports document increased MG weakness,  but modern contrast agents appear safer. Use  cautiously and observe for worsening.

## 2025-06-13 ENCOUNTER — HOSPITAL ENCOUNTER (OUTPATIENT)
Dept: LAB | Facility: MEDICAL CENTER | Age: 55
End: 2025-06-13
Attending: STUDENT IN AN ORGANIZED HEALTH CARE EDUCATION/TRAINING PROGRAM
Payer: COMMERCIAL

## 2025-06-13 DIAGNOSIS — G70.00 MYASTHENIA GRAVIS (HCC): ICD-10-CM

## 2025-06-13 PROCEDURE — 82784 ASSAY IGA/IGD/IGG/IGM EACH: CPT

## 2025-06-13 PROCEDURE — 36415 COLL VENOUS BLD VENIPUNCTURE: CPT

## 2025-06-13 PROCEDURE — 86041 ACETYLCHOLN RCPTR BNDNG ANTB: CPT

## 2025-06-13 PROCEDURE — 86042 ACETYLCHOLN RCPTR BLCKG ANTB: CPT

## 2025-06-15 LAB — IGA SERPL-MCNC: 250 MG/DL (ref 68–408)

## 2025-06-16 LAB
ACHR BIND AB SER-SCNC: 0.3 NMOL/L (ref 0–0.4)
ACHR BLOCK AB/ACHR TOTAL SFR SER: 20 % (ref 0–26)

## 2025-07-02 ENCOUNTER — APPOINTMENT (OUTPATIENT)
Dept: RADIOLOGY | Facility: MEDICAL CENTER | Age: 55
End: 2025-07-02
Attending: STUDENT IN AN ORGANIZED HEALTH CARE EDUCATION/TRAINING PROGRAM
Payer: COMMERCIAL

## 2025-07-09 ENCOUNTER — HOSPITAL ENCOUNTER (OUTPATIENT)
Dept: RADIOLOGY | Facility: MEDICAL CENTER | Age: 55
End: 2025-07-09
Attending: STUDENT IN AN ORGANIZED HEALTH CARE EDUCATION/TRAINING PROGRAM
Payer: COMMERCIAL

## 2025-07-09 DIAGNOSIS — G70.00 MYASTHENIA GRAVIS (HCC): ICD-10-CM

## 2025-07-09 PROCEDURE — 700117 HCHG RX CONTRAST REV CODE 255: Performed by: STUDENT IN AN ORGANIZED HEALTH CARE EDUCATION/TRAINING PROGRAM

## 2025-07-09 PROCEDURE — 71260 CT THORAX DX C+: CPT

## 2025-07-09 RX ADMIN — IOHEXOL 75 ML: 350 INJECTION, SOLUTION INTRAVENOUS at 14:20

## 2025-08-14 ENCOUNTER — OFFICE VISIT (OUTPATIENT)
Dept: NEUROLOGY | Facility: MEDICAL CENTER | Age: 55
End: 2025-08-14
Attending: STUDENT IN AN ORGANIZED HEALTH CARE EDUCATION/TRAINING PROGRAM
Payer: COMMERCIAL

## 2025-08-14 VITALS
SYSTOLIC BLOOD PRESSURE: 140 MMHG | BODY MASS INDEX: 25 KG/M2 | OXYGEN SATURATION: 97 % | RESPIRATION RATE: 16 BRPM | DIASTOLIC BLOOD PRESSURE: 88 MMHG | TEMPERATURE: 97.7 F | HEIGHT: 63 IN | WEIGHT: 141.09 LBS | HEART RATE: 68 BPM

## 2025-08-14 DIAGNOSIS — R53.1 WEAKNESS: Primary | ICD-10-CM

## 2025-08-14 PROCEDURE — 3079F DIAST BP 80-89 MM HG: CPT | Performed by: STUDENT IN AN ORGANIZED HEALTH CARE EDUCATION/TRAINING PROGRAM

## 2025-08-14 PROCEDURE — 99214 OFFICE O/P EST MOD 30 MIN: CPT | Performed by: STUDENT IN AN ORGANIZED HEALTH CARE EDUCATION/TRAINING PROGRAM

## 2025-08-14 PROCEDURE — 99215 OFFICE O/P EST HI 40 MIN: CPT

## 2025-08-14 PROCEDURE — 3077F SYST BP >= 140 MM HG: CPT | Performed by: STUDENT IN AN ORGANIZED HEALTH CARE EDUCATION/TRAINING PROGRAM

## 2025-08-14 ASSESSMENT — PATIENT HEALTH QUESTIONNAIRE - PHQ9
5. POOR APPETITE OR OVEREATING: 0 - NOT AT ALL
CLINICAL INTERPRETATION OF PHQ2 SCORE: 2
SUM OF ALL RESPONSES TO PHQ QUESTIONS 1-9: 3

## (undated) DEVICE — TUBING CLEARLINK DUO-VENT - C-FLO (48EA/CA)

## (undated) DEVICE — DRAPE T CRANIOTOMY W/POUCH - (9/CA)

## (undated) DEVICE — SET CATHETER MALECOT NEPH 24FR RE-ENTRY

## (undated) DEVICE — PROBE ULTRASOUND DISP 3.8X330

## (undated) DEVICE — SYRINGE 50ML CATHETER TIP (40EA/BX 4BX/CA)

## (undated) DEVICE — LACTATED RINGERS INJ 1000 ML - (14EA/CA 60CA/PF)

## (undated) DEVICE — PACK MINOR BASIN - (2EA/CA)

## (undated) DEVICE — HEAD HOLDER JUNIOR/ADULT

## (undated) DEVICE — CONTAINER SPECIMEN BAG OR - STERILE 4 OZ W/LID (100EA/CA)

## (undated) DEVICE — ELECTRODE 850 FOAM ADHESIVE - HYDROGEL RADIOTRNSPRNT (50/PK)

## (undated) DEVICE — DRAPE LARGE 3 QUARTER - (20/CA)

## (undated) DEVICE — KIT ROOM DECONTAMINATION

## (undated) DEVICE — ARMREST CRADLE FOAM - (2PR/PK 12PR/CA)

## (undated) DEVICE — NEEDLE NON SAFETY HYPO 22 GA X 1 1/2 IN (100/BX)

## (undated) DEVICE — SPONGE GAUZE STER 4X4 8-PL - (2/PK 50PK/BX 12BX/CS)

## (undated) DEVICE — ZIPWIRE .038 STRAIGHT BENTSON TIP (5EA/BX)

## (undated) DEVICE — SUTURE GENERAL

## (undated) DEVICE — BAG DRAINAGE URINARY CLOSED 2000ML (20EA/CA)

## (undated) DEVICE — SENSOR SPO2 NEO LNCS ADHESIVE (20/BX) SEE USER NOTES

## (undated) DEVICE — SODIUM CHL IRRIGATION 0.9% 1000ML (12EA/CA)

## (undated) DEVICE — GLOVE BIOGEL PI INDICATOR SZ 7.5 SURGICAL PF LF -(50/BX 4BX/CA)

## (undated) DEVICE — TAPE CLOTH MEDIPORE 6 INCH - (12RL/CA)

## (undated) DEVICE — SYRINGE 10 ML CONTROL LL (25EA/BX 4BX/CA)

## (undated) DEVICE — GLOVE BIOGEL SZ 7.5 SURGICAL PF LTX - (50PR/BX 4BX/CA)

## (undated) DEVICE — TOWELS CLOTH SURGICAL - (4/PK 20PK/CA)

## (undated) DEVICE — TUBE E-T HI-LO CUFF 6.5MM (10EA/BX)

## (undated) DEVICE — SPECIMEN PLASTIC CONVERTOR - (10/CA)

## (undated) DEVICE — PROBE PNEUMATIC DISP 1MMX497MM

## (undated) DEVICE — MASK ANESTHESIA ADULT  - (100/CA)

## (undated) DEVICE — WIRE GUIDE SENSOR DUAL FLEX - 5/BX

## (undated) DEVICE — SPONGE GAUZESTER 4 X 4 4PLY - (128PK/CA)

## (undated) DEVICE — SLEEVE, VASO, THIGH, MED

## (undated) DEVICE — SUCTION INSTRUMENT YANKAUER BULBOUS TIP W/O VENT (50EA/CA)

## (undated) DEVICE — FOLEY SLIPPERY 5CC 16 FR LF ALL SILICONE (12EA/CA)

## (undated) DEVICE — CANISTER SUCTION 3000ML MECHANICAL FILTER AUTO SHUTOFF MEDI-VAC NONSTERILE LF DISP  (40EA/CA)

## (undated) DEVICE — DRESSING ABDOMINAL PAD STERILE 8 X 10" (360EA/CA)"

## (undated) DEVICE — WIRE GUIDE .035X145 - (5/BX)

## (undated) DEVICE — SET LEADWIRE 5 LEAD BEDSIDE DISPOSABLE ECG (1SET OF 5/EA)

## (undated) DEVICE — GLOVE BIOGEL PI INDICATOR SZ 8.0 SURGICAL PF LF -(50/BX 4BX/CA)

## (undated) DEVICE — CATHETER FOLEY SILICONE 20FR 5CC - (12/BX)2-WAY UFD5964 OR 2D2622

## (undated) DEVICE — CATHETER URET DUAL LUMEN

## (undated) DEVICE — TRAY CATHETER FOLEY URINE METER W/STATLOCK 350ML (10EA/CA)

## (undated) DEVICE — SET IRRIGATION CYSTOSCOPY Y-TYPE L81 IN (20EA/CA)

## (undated) DEVICE — SODIUM CHL. IRRIGATION 0.9% 3000ML (4EA/CA 65CA/PF)

## (undated) DEVICE — BLADE SURGICAL #15 - (50/BX 3BX/CA)

## (undated) DEVICE — SET EXTENSION WITH 2 PORTS (48EA/CA) ***PART #2C8610 IS A SUBSTITUTE*****

## (undated) DEVICE — CATHETER BALLOON NEPHROMAX

## (undated) DEVICE — GOWN WARMING STANDARD FLEX - (30/CA)

## (undated) DEVICE — MEDICINE CUP STERILE 2 OZ - (100/CA)

## (undated) DEVICE — DRAPE C-ARM LARGE 41IN X 74 IN - (10/BX 2BX/CA)

## (undated) DEVICE — PROTECTOR ULNA NERVE - (36PR/CA)

## (undated) DEVICE — KIT ANESTHESIA W/CIRCUIT & 3/LT BAG W/FILTER (20EA/CA)

## (undated) DEVICE — SYRINGE 30 ML LL (56/BX)

## (undated) DEVICE — KIT 2.25IN UROS 2 PC DRN - 57MM 2 1/4 INCH (5/BX)

## (undated) DEVICE — NEPTUNE 4 PORT MANIFOLD - (20/PK)